# Patient Record
Sex: FEMALE | Race: BLACK OR AFRICAN AMERICAN
[De-identification: names, ages, dates, MRNs, and addresses within clinical notes are randomized per-mention and may not be internally consistent; named-entity substitution may affect disease eponyms.]

---

## 2017-04-20 NOTE — REP
CHEST, TWO VIEWS:

 

HISTORY: Hypercalcemia.

 

A diffuse increase in interstitial markings is present in the lungs. The heart is

normal in size. The pulmonary vasculature is normal in appearance. The bony

structure is intact.

 

IMPRESSION:

 

There is a diffuse increase in interstitial markings in the lungs. It can not be

determined if this is acute or chronic as no films are available for comparison.

This may represent chronic interstitial fibrosis or an acute process such as

interstitial pneumonia or edema.

 

 

Signed by

Josias Rascon MD 04/21/2017 08:35 A

## 2017-04-20 NOTE — HPEPDOC
General


Date of Admission


4/20/17


Other Providers


PCP: Mary Malloy


Attending Physician:  GORDON STALLWORTH MD


Chief Complaint


The patient is a 66-year-old female admitted with a reason for visit of 

Abnormal Lab Results.


Source:  Patient


Exam Limitations:  No limitations





History of Present Illness


66-year-old female with past medical history of hypertension, diet-controlled 

diabetes mellitus, and CVA with no residual deficits presented to the ER after 

she was sent in by her primary care provider for hypercalcemia noticed on lab 

work and complaints of persistent lethargy and fatigue over the last 3 months. 

The patient states that during this time she has felt increasingly tired and 

notes that she has had a 10 pound unintentional weight loss over the last 1 

month. She denies any acute complaints of fevers, chills, shortness of breath, 

chest pain, palpitations, abdominal pain, or any nausea/vomiting/diarrhea. She 

states that she has not gotten any of her cancer screening done other than a 

mammogram in 2015 which was normal.





In the ER, the patient was noted to have a serum calcium level 13.2 and ionized 

calcium level of 6.5. In addition the patient also had an acute kidney injury. 

The patient will be admitted under the service of Dr. Stallworth, for further 

evaluation and management of hypercalcemia.





Home Medications


Scheduled


Atenolol (Atenolol) 25 Mg Tab 25 MG PO QHS  (Reported) 


Chlorthalidone (Chlorthalidone) 25 Mg Tab 25 MG PO DAILY  (Reported) 


Potassium Chloride (Klor-Con M10) 10 Meq Tabcr 20 MEQ PO DAILY  (Reported) 





Allergies


Coded Allergies:  


     Amlodipine (Unverified  Allergy, Unknown, CAUSES GOUT, 4/20/17)


     Iodine (Verified  Allergy, Unknown, 6/7/14)


     Warfarin (Verified  Allergy, Unknown, 6/7/14)





Past Medical History


Medical History


As noted in HPI.


Surgical History


Had an implantable loop recorder placed in 2014.





Family History


Alcohol had thyroid cancer, aunt had diabetes mellitus





Social History


* Smoker:  Denies


Alcohol:  Denies


Drugs:  denies





Review of Symptoms


Other systems


10 point review of systems negative unless otherwise specified in HPI.





Physical Examination


General Exam:  Positive: Alert, Cooperative, No Acute Distress


ENT Exam:  Positive: Atraumatic, Mucous membr. moist/pink


Neck Exam:  Negative: JVD


Chest Exam:  Positive: Clear to auscultation, Normal air movement


Heart Exam:  Positive: Normal S1, Normal S2, Rate Normal


Telemetry:  Positive: Sinus


Abdomen Exam:  Positive: Soft, 


   Negative: Tenderness


Extremity Exam:  Negative: Swelling, Tenderness


Psych Exam:  Positive: Oriented x 3





Vital Signs





 Vital Signs








  Date Time  Temp Pulse Resp B/P Pulse Ox O2 Delivery O2 Flow Rate FiO2


 


4/20/17 19:14   18     


 


4/20/17 15:59 96.9 89   97 Room Air  











Laboratory Data


Labs 24H


 Laboratory Tests 2


4/20/17 17:57: 


Activated Partial Thromboplast Time 27.1, Aspartate Amino Transf (AST/SGOT) 12L

, Alanine Aminotransferase (ALT/SGPT) 18, Alkaline Phosphatase 68, Total 

Bilirubin 0.8, Direct Bilirubin 0.2, Albumin 3.3, Albumin/Globulin Ratio 0.58L, 

Amylase Level 121H, Anion Gap 7L, B-Type Natriuretic Peptide 26.0, White Blood 

Count 8.5, Red Blood Count 4.80, Hemoglobin 14.0, Hematocrit 41.0, Mean 

Corpuscular Volume 85.5, Mean Corpuscular Hemoglobin 29.2, Mean Corpuscular 

Hemoglobin Concent 34.1, Red Cell Distribution Width 14.4, Platelet Count 222, 

Neutrophils (%) (Auto) 75.6H, Lymphocytes (%) (Auto) 13.2L, Monocytes (%) (Auto

) 5.6H, Eosinophils (%) (Auto) 2.0, Basophils (%) (Auto) 0.4, Neutrophils # (

Auto) 6.4, Lymphocytes # (Auto) 1.1L, Monocytes # (Auto) 0.5, Eosinophils # (

Auto) 0.2, Basophils # (Auto) 0.0, Calcium Level 13.2H, Creatine Kinase MB 1.0, 

Creatine Kinase MB Relative Index 1.81, Free Thyroxine 1.28, Glomerular 

Filtration Rate > 60.0, Lactic Acid Level 1.1, Large Unclassified Cells # 0.3, 

Large Unclassified Cells % 3.3, Lipase 124, Magnesium Level 1.8, Parathyroid 

Hormone (Intact) 17.1, Phosphorus Level 2.2L, Prothromb Time International 

Ratio 0.94, Prothrombin Time 12.7, Thyroid Stimulating Hormone (TSH) 2.860, 

Total Creatine Kinase 55, Total Protein 9.0H, Troponin I < 0.02, Whole Blood 

Ionized Calcium 6.5*H


4/20/17 18:20: 


Urine Amorphous Sediment SMALLH, Urine Appearance CLEAR, Urine Color YELLOW, 

Urine pH 6.0, Urine Specific Gravity 1.008, Urine Protein NEGATIVE, Urine 

Glucose (UA) NEGATIVE, Urine Ketones NEGATIVE, Urine Urobilinogen 0.2, Urine 

Bilirubin NEGATIVE, Urine Leukocyte Esterase 2+H, Urine Bacteria (Auto) 1+H, 

Urine Blood NEGATIVE, Urine Calcium Carbonate Cryst(Auto) , Urine Calcium 

Oxalate Cryst (Auto) , Urine Calcium Phosphate Jazmin (Auto) , Urine Cellular 

Casts , Urine Cystine Crystals , Urine Granular Casts (Auto) , Urine Hyaline 

Casts (Auto) 1, Urine Leucine Crystals , Urine Mucus (Auto) SMALL, Urine 

Nitrite NEGATIVE, Urine Oval Fat Bodies (Auto) , Urine RBC (Auto) 5H, Urine 

Renal Epithelial Cells , Urine Sperm (Auto) , Urine Squamous Epithelial Cells 3

, Urine Transitional Epithelial Cells , Urine Trichomonas (Auto) , Urine Triple 

Phosphate Cryst (Auto) , Urine Tyrosine Crystals , Urine Uric Acid Crystals (

Auto) , Urine WBC (Auto) 10H, Urine Waxy Casts (Auto) , Urine Yeast-Like Cells (

Auto)


CBC/BMP


 Laboratory Tests


4/20/17 17:57








Red Blood Count 4.80, Mean Corpuscular Volume 85.5, Mean Corpuscular Hemoglobin 

29.2, Mean Corpuscular Hemoglobin Concent 34.1, Red Cell Distribution Width 14.4

, Neutrophils (%) (Auto) 75.6 H, Lymphocytes (%) (Auto) 13.2 L, Monocytes (%) (

Auto) 5.6 H, Eosinophils (%) (Auto) 2.0, Basophils (%) (Auto) 0.4, Neutrophils 

# (Auto) 6.4, Lymphocytes # (Auto) 1.1 L, Monocytes # (Auto) 0.5, Eosinophils # 

(Auto) 0.2, Basophils # (Auto) 0.0





Plan / VTE


VTE Prophylaxis Ordered?:  Yes





Plan


Plan


Hypercalcemia possibly 2/2 Underlying Malignancy vs Multiple Myeloma


We will admit the patient to the PCU


EKG with no acute changes


IV fluid hydration, and calcitonin ordered


SPEP, UPEP, vitamin D levels, PTH, PTHrP, serum viscosity, and skeletal bone 

survey studies ordered to further ascertain the etiology of the patient's 

hypercalcemia


CT of the Chest and Abd/Pelvis ordered to evaluate for possible underlying 

malignancy


We will continue to monitor the patient on telemetry and follow-up with serum 

calcium levels in the a.m.





Acute Kidney Injury 


Serum Creatinine 1.2 (Baseline 0.7-0.9)


IVF Hydration


Avoid Nephrotoxins


Follow up with BMP in the AM





Hypertension, stable


Continue Atenolol


Will Hold Chlorthalidone 2/2 JANA, and it's possible attributable affect of 

Hypercalcemia





Diet controlled diabetes mellitus


We'll place the patient on insulin sliding scale





History of CVA, with no residual deficits at this time


Not on ASA, Statin?


Will defer this to the patient's primary care provider





DVT prophylaxis-Lovenox subcutaneous





The patient will be admitted under the service of Dr. Stallworth, who will begin to 

follow the patient on 4/21/17 at 7 AM.








BETH COLLIER MD Apr 20, 2017 20:04

## 2017-04-20 NOTE — REP
CT abdomen and pelvis without IV or oral contrast:

 

History:  Weight loss and elevated serum calcium.

 

Findings:  The spleen is enlarged measuring 16 cm in anteroposterior span by 14

cm craniocaudal.  It is homogeneous.  Liver is not enlarged.  Gallbladder is

unremarkable.  No focal hepatic or splenic lesion is seen.  No adrenal lesion is

observed.  The kidneys are morphologically intact.  No pancreatic abnormality is

observed.  There is pancolonic diverticulosis without CT evidence of

diverticulitis.  Small and large intestinal bowel loops are otherwise

unremarkable.  The appendix has a normal appearance.  There is a calcification

adjacent to the appendix.  No uterine or ovarian mass is seen.  Bone window

settings show no bony destructive lesion.

 

Impression:

 

Moderate splenomegaly.  Pancolonic diverticulosis without evidence of

diverticulitis.  No mass or adenopathy seen.

 

 

Signed by

Ezio Dunbar MD 04/21/2017 07:55 A

## 2017-04-21 NOTE — ECGEPIP
Stationary ECG Study

                           [facility] - ED

                                       

                                       Test Date:    2017

Pat Name:     TYRON SELF            Department:   

Patient ID:   [id number]                 Room:         -

Gender:       F                        Technician:   lamin

:          [id number]               Requested By: Maja Lundborg-Gray 

Order Number: [id number]     Reading MD:   Hermila Kinney

                                 Measurements

Intervals                              Axis          

Rate:         84                       P:            57

TN:           210                      QRS:          -34

QRSD:         100                      T:            30

QT:           358                                    

QTc:          424                                    

                           Interpretive Statements

SINUS RHYTHM WITH FIRST DEGREE AV BLOCK

MARKED LEFT AXIS DEVIATION

NSTTW ABNORMALITY

SIMILAR 14

Electronically Signed On 2017 20:19:04 EDT by Hermila Kinney

## 2017-04-21 NOTE — CR
DATE OF CONSULTATION: 2017

 

PRIMARY CARE PROVIDER: Mary Malloy NP.

 

HISTORY OF PRESENT ILLNESS:

The patient is a 66-year-old female presenting to the hospital for 
hypercalcemia.

The patient initially reports having hypercalcemia back on 2017. At this

time, the patient's calcium was 12.3. The patient also reports having laboratory

work done with Dr. Moy on 2017, which also showed an elevated calcium

at 12.5. Since then, the patient then had a followup physical exam with her

primary care physician on 2017. Had lab work done at this time, and was

also found to have an elevated calcium at 13.5. The patient reports that then 
was

instructed to go to the hospital, and the patient was admitted at that time.

 

The patient reports having episodes of bronchitis-like illness for the past 15

years on-and-off.  Reports recently feeling fatigued and tired. The patient

admits to having constipation on occasion and uses Colace for this. She reports 
a

decreased desire to eat and a decreased appetite overall. She reports decreasing

liquid consumption and feels dehydrated and dry. During exam, the patient

complains of having dry, chapped lips. The patient denies any difficulty

swallowing or headaches. The patient admits to a cough that is on-and-off and

sometimes brings up phlegm and is productive. The patient admits to having sinus

problems and a deviated septum.

 

On admission to the hospital, the patient had a chest x-ray. The x-ray showed

reticular nodular pattern interstitial markings in bilateral lungs. There was no

comparison x-ray to compare to. Cannot be determined if it is acute or chronic. 
A

CT scan of the chest was performed without contrast. This showed reticular 
nodule

interstitial disease, as well as mediastinal lymphadenopathy and bronchiectasis.

At this point, pulmonology was consulted for this patient.

 

PAST MEDICAL HISTORY:

The patient has a history of hypertension, obstructive sleep apnea, diabetes, 
and

history of cerebrovascular accident.

 

SURGICAL HISTORY: The patient had deviated septum repair.

 

MEDICATIONS:

The patient takes atenolol 25 mg orally daily,  chlorthalidone 25 mg oral daily,

and potassium chloride 20 mEq by mouth daily.

 

ALLERGIES:

The patient has reported allergies to IODINE and WARFARIN, as well as 
AMLODIPINE.

 

 

SOCIAL HISTORY:

The patient lives in a senior building at home alone. The patient denies every

smoking, drinking or illicit substances. The patient has a long history of

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 
[employer]

many years ago. She is unsure if she has had asbestos exposure. Denies having

birds as pets.

 

FAMILY HISTORY:

The patient denies any family history of sarcoid or lung disease. The patient

reports having an uncle with thyroid cancer. Mother and father both are 
.

Father  of old age.

 

REVIEW OF SYSTEMS:

GENERAL: The patient reports having a recent weight loss. The patient reports at

one time trying to lose weight, but reports losing more weight than this. Denies

any fever or chills, or night sweats.

HEENT: The patient denies any eye pain or changes in vision. The patient denies

any trouble swallowing, difficulty swallowing. The patient admits to having a

deviated nasal septum. Reports possible surgery on this in the future.

NECK: The patient denies any pain in her neck. No lumps or bumps.

CARDIAC: The patient denies any chest pain or palpitations. The patient denies

orthopnea.

RESPIRATORY: The patient admits to having an occasional cough with some sputum.

Denies coughing up blood. Denies shortness of breath.

GASTROINTESTINAL (GI): The patient admits to occasional constipation and takes

Colace for this. The patient denies abdominal pain, nausea or vomiting. Admits 
to

decreased appetite.

MUSCULOSKELETAL: The patient denies muscular weakness, stiffness or pain.

NEUROLOGIC: The patient denies any tremors, weakness or dizziness. The patient

denies numbness.

VASCULAR: The patient denies calf pain with walking, or leg cramping.

URINARY: The patient denies any burning, pain or frequency with urination.

SKIN: The patient does admit to having hives. This was resolved with some cream.

The patient does admit to having some remaining in her arms.

ENDOCRINE: The patient denies hot or cold intolerance. The patient denies

frequent thirst.

PSYCHIATRIC: The patient denies depression, or nervousness or anxiety.

 

PHYSICAL EXAMINATION:

Vitals:  T97.1, P75, RR 18 /80, 95% on RA

APPEARANCE: The patient appears alert, oriented, cooperative and comfortable. No

respiratory distress.

HEENT: Extraocular muscles are intact. Pupils are equal and reactive to light 
and

accommodation. No lesions or masses in oropharynx.

Lymph: No cervical or supraclavicular adenopathy

CARDIAC: Normal S1, S2. No clicks, rubs, gallops or murmurs.  PMI is non 
displaced.  No elevated JVP.

RESPIRATORY: Clear to auscultation bilaterally. No wheezing or crackles. 
Negative

for egophony.  No accessory muscle use.  No dullness to percussion.

ABDOMEN: Bowel sounds clear to auscultation. No pain or tenderness to palpation.

No masses or organomegaly palpated.

EXTREMITIES: Radial pulses palpated bilaterally 2/4. No cyanosis or clubbing.

Skin:  Two small nodules palpated

in each upper extremity in the forearm. They are both less than 0.5 cm, and

freely mobile nodular lesions. They are nontender to palpation.  No jaundice.

Musculoskeletal:  No muscle wasting, no unilateral weakness.

 

LABORATORY DATA:

On admission, the patient's calcium was 12.7. The patient's total protein was

9.0, albumin 3.3. The patient's TSH was 2.86. Sodium 138, potassium 3.6, 
chloride

103, CO2 28, BUN 17, creatinine 1.15, fasting glucose 82. Rheumatoid factor was

less than 10.

UA on admission showed yellow clear urine with a urine pH of 6.0. There was 2+

leukocyte esterase and 10 white blood cell count and 5 red blood cell count.

There was 1+ urine bacteria, and everything else in the urine was negative.

25-hydroxylate vitamin D 22.7. An SPEP from the previous day showed a normal

pattern. Another SPEP was taken on admission and is still pending. UPEP on

admission was also still pending. Immunotype is pending and TB test is pending.

RADIOLOGIC IMAGING:

Chest x-ray performed on  showed reticular nodule pattern of interstitium.

CT of chest also showed a reticular nodular interstitial disease, as well as

mediastinal lymphadenopathy and bronchiectasis.

Abdominal CT was performed and showed enlarged spleen that was homogenous with 
no

lesions. Liver showed no lesions. There was a breast lesion the size of 2.1 cm 
in

the right breast.

Bone osseous survey showed multiple lytic lesions in the skull.

 

ASSESSMENT AND PLAN:

1.  Abnormal CT of the chest. The patient's history and exam are most likely

consistent with sarcoidosis. The patient's calcium was high at 12.7 on 
admission.

The patient's CT scan is characteristic of sarcoidosis. The patient has a

multi-year history of having episodes of difficulty breathing and not feeling

well. The patient also has hypercalcemia. The patient will need continued workup

for other causes of hypercalcemia. These labs are currently

being pending and will be helpful for diagnosis. We will not treat the patient

for sarcoidosis at this time until full workup is completed. The patient will

need completed workup for outpatient lymph node biopsy and lung bronchoscopy.

Some of this differential is discussed with patient, including multiple myeloma

which is being tested through labs. The patient also has lab work to help 
support

sarcoidosis that is pending at this time. At this time, medications may also 
need

to be looked at for possible cause of hypercalcemia. The patient formally took

vitamin D which had been stopped at this time and for the past few months. Other

medications that cause hypercalcemia need to be considered. Hyperparathyroidism

also could be considered; however, on lab work, parathyroid hormone (PTH) was

performed and was 17.1 which is a normal value. PTH-related protein lab is

currently pending. While in the hospital, the patient will need to be treated 
for

hypercalcemia. Medications to be considered include bisphosphonates and other

medications to help lower the calcium, as well as hydration. Plan is to biopsy 
the

patient upon discharge from hospital through outpatient procedure.

 

My preceptor for this patient encounter was Dr. Avtar Pardo. The preceptor was

physically present in the room during the encounter and was fully available

as needed. All aspects of the patient interview, examination, medical decision

making process, and medical care plan development were reviewed and approved by

the preceptor. The preceptor is aware and concurs with the plan as stated in the

body of this note and will attest to such by his/her co-signature.



I, Avtar Pardo, conducted an independent history and physical.  I believe the 
abnormal chest CT is likely from sarcoid which could easily be the cause of her 
hypercalcemia.  The findings on chest CT suggest that this has been a chronic 
condition.  Biopsy will be performed as an outpt.  I discussed the risks and 
benefits of the procedure with the patient and she agrees to proceed.  It is 
quite curious as to why she is hypercalcemic now after having this for so long 
and therefore other causes of hypercalcemia or other potential contributors 
should be looked at.   

LENO

## 2017-04-21 NOTE — IPNPDOC
Subjective


Date Seen


The patient was seen on 4/21/17.





Subjective


Chief Complaint/HPI


The patient is a 66-year-old female admitted with a reason for visit of 

Hypercalcemia.


Events since last encounter


patient does not have any acute complaints today , was sent in yesterday by pmd 

for abnormal lab work , she did complain of some unintentional weight loss over 

the past 3 months.





Objective


Physical Examination


General Exam:  Positive: Alert, Cooperative, No Acute Distress


ENT Exam:  Positive: Atraumatic, Mucous membr. moist/pink


Neck Exam:  Negative: JVD


Chest Exam:  Positive: Clear to auscultation, Normal air movement


Heart Exam:  Positive: Normal S1, Normal S2, Rate Normal


Telemetry:  Positive: Sinus


Abdomen Exam:  Positive: Soft, 


   Negative: Tenderness


Extremity Exam:  Negative: Swelling, Tenderness


Psych Exam:  Positive: Oriented x 3





Assessment /Plan


Problems





(1) Hypercalcemia


Status:  Acute


Problem Text:  will continue IVF , give lasix and calcitonin. 


will hold hydrochlorthalidone. 


PTH low. 25 oh vit D low, others pending. 





(2) Hilar lymphadenopathy


Status:  Acute


Problem Text:  hilar and mediastinal lymphadenopathy with diffuse 

reticulonodular infiltrates


Dr Rasmussen to see.





(3) Lytic lesion of bone on x-ray


Status:  Acute


Problem Text:  malignancy work up and MM work up in progress. 





(4) Breast mass, right


Status:  Chronic


Problem Text:  will get breast ultrasound. 





(5) Hypertension


Status:  Chronic


Problem Text:  will start clonidine , on discharge will change to ACEI





(6) Diabetes mellitus


Status:  Chronic


Problem Text:  diet controlled. 





(7) JANA (acute kidney injury)


Status:  Resolved


(8) SALOMÓN on CPAP


Status:  Chronic


(9) Hyperlipidemia


Status:  Chronic


(10) History of CVA (cerebrovascular accident) without residual deficits


Status:  Chronic


(11) Weight loss


Status:  Acute


Problem Text:  with anorexia over the past 3 months. 


malignancy work up has been started. 








Plan/VTE


VTE Prophylaxis Ordered?:  Yes





VS, I&O, 24H, Lio


Vital Signs/I&O





 Vital Signs








  Date Time  Temp Pulse Resp B/P Pulse Ox O2 Delivery O2 Flow Rate FiO2


 


4/21/17 07:57 97.8 85 20 177/79 97 Room Air  














 I&O- Last 24 Hours up to 6 AM 


 


 4/21/17





 06:00


 


Intake Total 950 ml


 


Output Total 450 ml


 


Balance 500 ml











Laboratory Data


24H LABS


 Laboratory Tests 2


4/20/17 17:50: 


25-Hydroxy Vitamin D Total 22.7L, Rheumatoid Factor < 10.0, Total Protein 9.2H


4/20/17 17:57: 


Total Protein 9.0H, Activated Partial Thromboplast Time 27.1, Aspartate Amino 

Transf (AST/SGOT) 12L, Alanine Aminotransferase (ALT/SGPT) 18, Alkaline 

Phosphatase 68, Total Bilirubin 0.8, Direct Bilirubin 0.2, Albumin 3.3, Albumin/

Globulin Ratio 0.58L, Amylase Level 121H, Anion Gap 7L, B-Type Natriuretic 

Peptide 26.0, White Blood Count 8.5, Red Blood Count 4.80, Hemoglobin 14.0, 

Hematocrit 41.0, Mean Corpuscular Volume 85.5, Mean Corpuscular Hemoglobin 29.2

, Mean Corpuscular Hemoglobin Concent 34.1, Red Cell Distribution Width 14.4, 

Platelet Count 222, Neutrophils (%) (Auto) 75.6H, Lymphocytes (%) (Auto) 13.2L, 

Monocytes (%) (Auto) 5.6H, Eosinophils (%) (Auto) 2.0, Basophils (%) (Auto) 0.4

, Neutrophils # (Auto) 6.4, Lymphocytes # (Auto) 1.1L, Monocytes # (Auto) 0.5, 

Eosinophils # (Auto) 0.2, Basophils # (Auto) 0.0, Calcium Level 13.2H, Creatine 

Kinase MB 1.0, Creatine Kinase MB Relative Index 1.81, Free Thyroxine 1.28, 

Glomerular Filtration Rate > 60.0, Lactic Acid Level 1.1, Large Unclassified 

Cells # 0.3, Large Unclassified Cells % 3.3, Lipase 124, Magnesium Level 1.8, 

Parathyroid Hormone (Intact) 17.1, Phosphorus Level 2.2L, Prothromb Time 

International Ratio 0.94, Prothrombin Time 12.7, Thyroid Stimulating Hormone (

TSH) 2.860, Total Creatine Kinase 55, Troponin I < 0.02, Whole Blood Ionized 

Calcium 6.5*H


4/20/17 18:20: 


Urine Amorphous Sediment SMALLH, Urine Appearance CLEAR, Urine Color YELLOW, 

Urine pH 6.0, Urine Specific Gravity 1.008, Urine Protein NEGATIVE, Urine 

Glucose (UA) NEGATIVE, Urine Ketones NEGATIVE, Urine Urobilinogen 0.2, Urine 

Bilirubin NEGATIVE, Urine Leukocyte Esterase 2+H, Urine Bacteria (Auto) 1+H, 

Urine Blood NEGATIVE, Urine Calcium Carbonate Cryst(Auto) , Urine Calcium 

Oxalate Cryst (Auto) , Urine Calcium Phosphate Jazmin (Auto) , Urine Cellular 

Casts , Urine Cystine Crystals , Urine Granular Casts (Auto) , Urine Hyaline 

Casts (Auto) 1, Urine Leucine Crystals , Urine Mucus (Auto) SMALL, Urine 

Nitrite NEGATIVE, Urine Oval Fat Bodies (Auto) , Urine Total Protein 26.5H, 

Urine RBC (Auto) 5H, Urine Renal Epithelial Cells , Urine Sperm (Auto) , Urine 

Squamous Epithelial Cells 3, Urine Transitional Epithelial Cells , Urine 

Trichomonas (Auto) , Urine Triple Phosphate Cryst (Auto) , Urine Tyrosine 

Crystals , Urine Uric Acid Crystals (Auto) , Urine WBC (Auto) 10H, Urine Waxy 

Casts (Auto) , Urine Yeast-Like Cells (Auto) 


4/20/17 22:44: Bedside Glucose (Misc Panel) 107


4/21/17 05:15: 


Blood Urea Nitrogen 14, Creatinine 0.94, Sodium Level 140, Potassium Level 3.0L

, Chloride Level 104, Carbon Dioxide Level 28, Calcium Level 11.6H, Aspartate 

Amino Transf (AST/SGOT) 12L, Alanine Aminotransferase (ALT/SGPT) 15, Alkaline 

Phosphatase 53, Total Bilirubin 0.7, Total Protein 8.5H, Albumin 2.9L, Albumin/

Globulin Ratio 0.52L, Anion Gap 8, Glomerular Filtration Rate > 60.0


4/21/17 05:16: 


CBC/BMP


 Laboratory Tests


4/20/17 17:57








Red Blood Count 4.80, Mean Corpuscular Volume 85.5, Mean Corpuscular Hemoglobin 

29.2, Mean Corpuscular Hemoglobin Concent 34.1, Red Cell Distribution Width 14.4

, Neutrophils (%) (Auto) 75.6 H, Lymphocytes (%) (Auto) 13.2 L, Monocytes (%) (

Auto) 5.6 H, Eosinophils (%) (Auto) 2.0, Basophils (%) (Auto) 0.4, Neutrophils 

# (Auto) 6.4, Lymphocytes # (Auto) 1.1 L, Monocytes # (Auto) 0.5, Eosinophils # 

(Auto) 0.2, Basophils # (Auto) 0.0





4/21/17 05:15








Red Blood Count 4.33, Mean Corpuscular Volume 84.8, Mean Corpuscular Hemoglobin 

29.0, Mean Corpuscular Hemoglobin Concent 34.2, Red Cell Distribution Width 14.4

, Calcium Level 11.6 H, Aspartate Amino Transf (AST/SGOT) 12 L, Alanine 

Aminotransferase (ALT/SGPT) 15, Alkaline Phosphatase 53, Total Bilirubin 0.7, 

Total Protein 8.5 H, Albumin 2.9 L








GORDON COOLEY MD Apr 21, 2017 11:06

## 2017-04-21 NOTE — REP
Clinical:  Hypercalcemia with mediastinal/hilar adenopathy and newly diagnosed

lung disease.

 

Technique:  16 total images to include the skull, spine, chest, bilateral humeri

and femurs.

 

Findings:

With the exception of a few scattered lytic lesions projecting over the frontal

bone on PA skull radiograph, no further lytic or blastic abnormalities are

identified.

 

Age related degenerative changes to the lumbosacral, thoracic and cervical spine

noted without fracture / compression injury or subluxation.  Age-related

degenerative changes involving the bilateral shoulder, hip and knee joints noted.

No acute or obvious healed fracture identified.

 

Frontal view of the thoracic spine demonstrates hilar adenopathy (right greater

than left) and diffuse reticulonodular interstitial disease.

 

Impression:

1.  Few scattered lytic lesions projecting over the frontal bone are nonspecific

and no other lytic/blastic lesions or evidence for metastatic disease

appreciated.

2.  Degenerative changes to the spine without acute fracture / compression injury

or subluxation.

3.  Age-related degenerative changes to the shoulder, hip and knee joints.

 

 

Signed by

Kuldeep Vázquez MD 04/21/2017 12:46 A

## 2017-04-21 NOTE — REP
Focused right breast sonography:

 

History:  Right breast mass seen on CT study of the chest.

 

Findings:  The right breast is scanned medially from 12-o'clock to 6-o'clock.

The patient reports that the mass found here corresponds to a palpable lump that

she claims to have for 20 years.

 

Findings:  At 3-o'clock there is an oval-shaped well-circumscribed hypoechoic

lesion with enhanced through transmission consistent with a fibroadenoma.  Its

long axis is roughly parallel to the skin.  Its dimensions are 2.0 x 2.0 x 1.0

cm.  It is compatible with a fibroadenoma.

 

Impression:

 

Hypoechoic oval shaped mass in the medial aspect of the right breast compatible

with fibroadenoma.  Stable for many years by history.  BIRADS category II benign

sonography.

 

 

Signed by

Ezio Dunbar MD 04/21/2017 05:02 P

## 2017-04-22 NOTE — IPNPDOC
Subjective


Date Seen


The patient was seen on 4/22/17.





Subjective


Chief Complaint/HPI


The patient is a 66-year-old female admitted with a reason for visit of 

Hypercalcemia.


Events since last encounter


No complaints this am.





Objective


Physical Examination


General Exam:  Positive: Alert, Cooperative, No Acute Distress


ENT Exam:  Positive: Atraumatic, Mucous membr. moist/pink


Neck Exam:  Negative: JVD


Chest Exam:  Positive: Clear to auscultation, Normal air movement


Heart Exam:  Positive: Normal S1, Normal S2, Rate Normal


Telemetry:  Positive: Sinus


Abdomen Exam:  Positive: Soft, 


   Negative: Tenderness


Extremity Exam:  Negative: Swelling, Tenderness


Psych Exam:  Positive: Oriented x 3





Assessment /Plan


Problems





(1) Hypercalcemia


Status:  Acute


Problem Text:  resolved will stop calcitonin.will continue low dose lasix 


will hold hydrochlorthalidone. 


PTH low. 25 oh vit D low, others pending. 





(2) Hilar lymphadenopathy


Status:  Acute


Problem Text:  hilar and mediastinal lymphadenopathy with diffuse 

reticulonodular infiltrates


To follow with Dr Rasmussen as outpatient for bronchoscopy and biopsy. 





(3) Lytic lesion of bone on x-ray


Status:  Acute


Problem Text:  malignancy work up and MM work up in progress. 





(4) Breast mass, right


Status:  Chronic


Problem Text:  mammogram and breast US shows its fibroadenoma stable from many 

years. 





(5) Hypertension


Status:  Chronic


Problem Text:  will start clonidine , lisinopril





(6) Diabetes mellitus


Status:  Chronic


Problem Text:  diet controlled. 





(7) JANA (acute kidney injury)


Status:  Resolved


(8) SALOMÓN on CPAP


Status:  Chronic


(9) Hyperlipidemia


Status:  Chronic


(10) History of CVA (cerebrovascular accident) without residual deficits


Status:  Chronic


(11) Weight loss


Status:  Acute


Problem Text:  with anorexia over the past 3 months. 


malignancy work up has been started. 








Plan/VTE


VTE Prophylaxis Ordered?:  Yes





VS, I&O, 24H, Fishbone


Vital Signs/I&O





 Vital Signs








  Date Time  Temp Pulse Resp B/P Pulse Ox O2 Delivery O2 Flow Rate FiO2


 


4/22/17 06:00 98.5 71 20 166/77 95 Room Air  














 I&O- Last 24 Hours up to 6 AM 


 


 4/22/17





 06:00


 


Intake Total 2040 ml


 


Output Total 2500 ml


 


Balance -460 ml











Laboratory Data


24H LABS


 Laboratory Tests 2


4/21/17 12:12: Bedside Glucose (Misc Panel) 122H


4/21/17 17:05: Bedside Glucose (Misc Panel) 120H


4/21/17 20:39: Bedside Glucose (Misc Panel) 107


4/22/17 05:56: 


Blood Urea Nitrogen 10, Creatinine 0.91, Sodium Level 141, Potassium Level 4.1#

, Chloride Level 109H, Carbon Dioxide Level 25, Calcium Level 10.2, Aspartate 

Amino Transf (AST/SGOT) 10L, Alanine Aminotransferase (ALT/SGPT) 14, Alkaline 

Phosphatase 58, Total Bilirubin 0.7, Total Protein 8.2, Albumin 3.0L, Albumin/

Globulin Ratio 0.58L, Anion Gap 7L, Glomerular Filtration Rate > 60.0, Whole 

Blood Ionized Calcium 5.8H


CBC/BMP


 Laboratory Tests


4/22/17 05:56








Calcium Level 10.2, Aspartate Amino Transf (AST/SGOT) 10 L, Alanine 

Aminotransferase (ALT/SGPT) 14, Alkaline Phosphatase 58, Total Bilirubin 0.7, 

Total Protein 8.2, Albumin 3.0 L, Red Blood Count 4.40, Mean Corpuscular Volume 

86.4, Mean Corpuscular Hemoglobin 28.3, Mean Corpuscular Hemoglobin Concent 32.8

, Red Cell Distribution Width 14.4








GORDON COOLEY MD Apr 22, 2017 08:37

## 2017-04-23 NOTE — IPNPDOC
Subjective


Date Seen


The patient was seen on 4/23/17.





Subjective


Chief Complaint/HPI


The patient is a 66-year-old female admitted with a reason for visit of 

Hypercalcemia.


Events since last encounter


no complaints





Objective


Physical Examination


General Exam:  Positive: Alert, Cooperative, No Acute Distress


ENT Exam:  Positive: Atraumatic, Mucous membr. moist/pink


Neck Exam:  Negative: JVD


Chest Exam:  Positive: Clear to auscultation, Normal air movement


Heart Exam:  Positive: Normal S1, Normal S2, Rate Normal


Telemetry:  Positive: Sinus


Abdomen Exam:  Positive: Soft, 


   Negative: Tenderness


Extremity Exam:  Negative: Swelling, Tenderness


Psych Exam:  Positive: Oriented x 3





Assessment /Plan


Problems





(1) Hypercalcemia


Status:  Acute


Problem Text:  Calcium again rising. Will give pamidronate 1 dose. 


will continue low dose lasix 


PTH low. 25 oh vit D low, others pending. 





(2) Hilar lymphadenopathy


Status:  Acute


Problem Text:  hilar and mediastinal lymphadenopathy with diffuse 

reticulonodular infiltrates


To follow with Dr Rasmussen as outpatient for bronchoscopy and biopsy. 





(3) Lytic lesion of bone on x-ray


Status:  Acute


Problem Text:  malignancy work up and MM work up in progress. 





(4) Breast mass, right


Status:  Chronic


Problem Text:  mammogram and breast US shows its fibroadenoma stable from many 

years. 





(5) Hypertension


Status:  Chronic


Response to Treatment:  Improving


Problem Text:  better controlled with losartan and clonidine and atenolol





(6) Diabetes mellitus


Status:  Chronic


Problem Text:  diet controlled. 





(7) JANA (acute kidney injury)


Status:  Resolved


(8) SALOMÓN on CPAP


Status:  Chronic


(9) Hyperlipidemia


Status:  Chronic


(10) History of CVA (cerebrovascular accident) without residual deficits


Status:  Chronic


(11) Weight loss


Status:  Acute


Problem Text:  with anorexia over the past 3 months. 


malignancy work up has been started. 








Plan/VTE


VTE Prophylaxis Ordered?:  Yes





VS, I&O, 24H, Fishbone


Vital Signs/I&O





 Vital Signs








  Date Time  Temp Pulse Resp B/P Pulse Ox O2 Delivery O2 Flow Rate FiO2


 


4/23/17 06:00 98.2 65 18 148/70 93 Room Air  














 I&O- Last 24 Hours up to 6 AM 


 


 4/23/17





 06:00


 


Intake Total 1260 ml


 


Output Total 1250 ml


 


Balance 10 ml











Laboratory Data


24H LABS


 Laboratory Tests 2


4/22/17 11:50: Bedside Glucose (Misc Panel) 116H


4/22/17 16:54: Bedside Glucose (Misc Panel) 110


4/22/17 20:39: Bedside Glucose (Misc Panel) 111


4/23/17 06:07: 


Blood Urea Nitrogen 10, Creatinine 0.83, Sodium Level 138, Potassium Level 3.2#L

, Chloride Level 105, Carbon Dioxide Level 26, Calcium Level 10.9H, Aspartate 

Amino Transf (AST/SGOT) 9L, Alanine Aminotransferase (ALT/SGPT) 13, Alkaline 

Phosphatase 52, Total Bilirubin 0.8, Total Protein 7.9, Albumin 2.8L, Albumin/

Globulin Ratio 0.55L, Anion Gap 7L, Glomerular Filtration Rate > 60.0


CBC/BMP


 Laboratory Tests


4/23/17 06:07








Calcium Level 10.9 H, Aspartate Amino Transf (AST/SGOT) 9 L, Alanine 

Aminotransferase (ALT/SGPT) 13, Alkaline Phosphatase 52, Total Bilirubin 0.8, 

Total Protein 7.9, Albumin 2.8 L, Red Blood Count 4.35, Mean Corpuscular Volume 

85.5, Mean Corpuscular Hemoglobin 29.5, Mean Corpuscular Hemoglobin Concent 34.5

, Red Cell Distribution Width 14.4








GORDON COOLEY MD Apr 23, 2017 09:15

## 2017-04-24 NOTE — DSES
DATE OF ADMISSION:  04/20/2017

DATE OF DISCHARGE:  04/24/2017

 

PRIMARY CARE PROVIDER: Mary Malloy NP

 

DISCHARGE DIAGNOSES:

1. Hypercalcemia, workup under progress, most probably sarcoidosis.

2. Hypertension.

3. Hypokalemia.

4. Right breast fibroadenoma.

5. Lytic lesions on the skull x-ray.

6. Acute kidney injury, resolved.

7. Diabetes, on diet control.

8. Obstructive sleep apnea (SALOMÓN), on continuous positive airway pressure (CPAP).

 

9. Hyperlipidemia.

10. History of cerebrovascular accident (CVA) without any residual deficits.

11. Hilar lymphadenopathy, to followup with Dr. Pardo as an outpatient.

 

DISCHARGE MEDICATIONS:

- atenolol 50 mg at bedtime

- losartan 100 mg daily

- Lasix 20 mg daily

- prednisone 20 mg daily

- clonidine 0.1 mg by mouth three times a day as needed if systolic blood

pressure greater than 170

- potassium chloride 20 mEq by mouth daily

 

HOSPITAL COURSE: This is a 66-year-old female who has been noted to have

hypercalcemia since March of 2017 and sent in by her primary care provider this

time for a calcium level of 13.2. The patient did complain of feeling weak and

tired and losing weight over the past three months. The patient was recently

started on regular vitamin D supplementation from January of 2017 which was

stopped in March 2017 when she was first noted to be hypercalcemic. The patient

had symptoms of bronchitis, cough, and nasal congestion with a deviated nasal

septum going on for years. Here the patient had a CT chest which showed bilateral

hilar lymphadenopathy, diffuse reticular nodular interstitial lung disease, and

bilateral mediastinal lymphadenopathy, most consistent with granulomatous disease

like sarcoidosis. The patient was evaluated by Dr. Pardo from pulmonology and the

plan is to have outpatient bronchoscopy and biopsy done within the next 2-3

weeks. In the hospital, the patient was initially treated with IV fluids, Lasix,

and calcitonin for her calcium. After stopping calcitonin and IV fluids, her

calcium again lucero so the patient was given one dose of pamidronate 60 mg. The

patient's antihypertensive medications were adjusted. With current regimen of

medications, her blood pressure is reasonably well-controlled. The patient also

has malignancy workup in progress including parathyroid hormone-related protein

(PTHrP), multiple myeloma workup. The results of which need to be followed up as

an outpatient. The patient was also noted to be hypokalemic in the hospital which

was replaced aggressively. Her sugars remained in acceptable control with only

diet control and did not require any medications. Her tuberculosis (TB)

interferon tests were negative. The patient also had a CT abdomen and pelvis done

which showed moderate splenomegaly, diverticulosis. No mass or lymphadenopathy.

CT chest showed a breast mass on the right, so the patient underwent breast

ultrasound and it was found to be fibroadenoma which has been stable for many

years. At present, the patient is symptom free. Her blood pressure is

well-controlled. Functionally, she is at her baseline and is going to be

discharged home in a stable condition.

 

PHYSICAL EXAMINATION:

VITAL SIGNS: Temperature 96.8, pulse 66, respiratory rate 18, blood pressure

155/73, pulse oximetry 98% on room air.

GENERAL: Patient is awake, alert and oriented times three, sitting up in bed, in

no acute distress.

HEENT: Normocephalic, atraumatic. Moist mucous membranes. Anicteric eyes.

CHEST: Overall poor air entry but there is no wheezing or crackles.

CARDIOVASCULAR: S1, S2, regular. No rub, murmur, or gallop.

ABDOMEN: Soft, nontender. Bowel sounds present.

EXTREMITIES: No edema.

 

LABORATORY DATA: WBC 10, hemoglobin 12.2, platelet count 229.

 

Sodium 137, potassium 2.8 being replaced, chloride 103, bicarbonate 26, BUN 11,

creatinine 0.8, calcium 10.9, bilirubin 0.7. Liver function tests are normal.

Albumin is 2.8.

 

Bone survey showed few scattered lytic lesions projecting over the frontal lobe,

nonspecific. There were no other lytic or blastic lesions and no evidence of

metastatic disease appreciated.

 

CT scan of chest, abdomen and pelvis as above.

 

DISPOSITION: The patient is discharged home in a stable condition.

 

DISCHARGE INSTRUCTIONS: The patient is to followup with primary care provider for

laboratory work in three days. The patient is to followup with Dr. Pardo in 1-2

weeks. Regular diet. Activity as tolerated. The patient is to abstain from any

calcium or vitamin D supplements.

 

ADDITIONAL DISCHARGE DIAGNOSIS:

Chronic deviated nasal septum with blockage of left nostril.

## 2017-05-01 NOTE — REP
CT study of the chest without contrast:

 

History:  Weight loss.

 

Comparison is made with today's chest x-ray showing diffuse interstitial lung

disease.  Elevated serum calcium.

 

CT findings:  There is an extensive pattern of reticulonodular opacities in the

central lobular and heraclio bronchovascular tree in bud type distribution, upper

lobe predominant and bilateral.  There are also nodular changes in the lower

lobes as well, but less numerous.  There is some heraclio bronchovascular

opacification with air bronchograms in the lower lobes and to a lesser extent in

the upper lobes.  There is mediastinal lymphadenopathy and hilar fullness

bilaterally.  Pretracheal and aorticopulmonary window adenopathy is seen.

Subcarinal nodes are prominent.  The anterior mediastinum appears spared.  There

is an oval shaped 2.1 cm nodule in the medial aspect of the right breast soft

tissues, which appears smoothly marginated.  This may be a fibroadenoma.  No bony

abnormality is appreciated.

 

Impression:

 

Diffuse reticulonodular interstitial lung disease associated with bilateral hilar

and mediastinal adenopathy.  The possibilities include granulomatous disease such

as sarcoidosis and other granulomatous diseases. Differential is broad and may

include lymphangitic spread of malignancy.  Pulmonary medicine evaluation is

warranted.

 

Also noted is an oval-shaped right medial breast mass, which may be a

fibroadenoma.  Focused right breast ultrasound is recommended for further

evaluation and consideration of ultrasound guided needle biopsy.

 

 

Signed by

Ezio Dunbar MD 04/21/2017 07:55 A General

## 2017-06-06 NOTE — PFTRPT
Tech: LEOBARDOLolis LICEA RRT

Age: 66  Sex: Female  Race: Black

Height: 63.50  Inches  Weight: 185.00  Lbs  BSA: 1.88

Diagnosis: D86.2



TECH NOTES: Test meets the ATS standards for acceptability and repeatability.  
The patient was given 

four puffs of albuterol for postbronchodilator.

 

PULMONARY FUNCTION REPORT



ORDERING PROVIDER: Avtar Pardo DO



DATE OF SERVICE: 06/06/17



SPIROMETRY: Pre and post bronchodilator study of excellent technical quality.  
The forced vital capacity is normal.  The FEV1 is in proportion.  The 
obstructive index is, therefore, normal.



FLOW VOLUME LOOP: The expiratory limb of the flow volume loop is normal.  No 
bronchodilator response is identified.



LUNG VOLUMES: The total lung capacity is normal.  The residual volume is in 
proportion.



DIFFUSION CAPACITY: The diffusion capacity is normal.



HEMOGLOBIN: The hemoglobin is acceptable at 15.2.



AIRWAY MECHANICS: Airways resistance and conductance are normal.



IMPRESSION: Normal study.
MTDD

## 2017-07-21 NOTE — REP
CT of the brain without IV contrast:

 

There are no comparisons.

 

There is no hemorrhage.  There is no edema, mass effect or midline shift.  The

cortical stripe is unremarkable.  The ventricles and sulci are normal size.  The

visualized paranasal sinuses and mastoid air cells are clear.

 

Impression:

 

There is no hemorrhage, acute infarct or mass.  Negative CT study of the brain.

 

 

Signed by

Lenny Germain MD 07/21/2017 01:29 P

## 2017-07-22 NOTE — ECGEPIP
Stationary ECG Study

                           [facility] - ED

                                       

                                       Test Date:    2017

Pat Name:     TYRON SELF            Department:   

Patient ID:   [id number]                 Room:         -

Gender:       F                        Technician:   froy

:          1950               Requested By: Jeremy Almanza PA-C

Order Number: [id number]     Reading MD:   Hermila Kinney

                                 Measurements

Intervals                              Axis          

Rate:         79                       P:            62

IA:           193                      QRS:          -41

QRSD:         83                       T:            60

QT:           381                                    

QTc:          437                                    

                           Interpretive Statements

SINUS RHYTHM

MARKED LEFT AXIS DEVIATION

NONSPECIFIC T-WAVE ABNORMALITY

SIMILAR 17

Electronically Signed On 2017 19:59:19 EDT by Hermila Kinney

## 2017-10-19 NOTE — REP
Clinical:  Enlarged thyroid gland.

 

Technique:  Real time gray scale ultrasound examination using linear high

frequency transducer.

 

Findings:

The thyroid gland is diffusely heterogeneous, but normal in contour and size.

 

The right thyroid lobe measures 3.3 x 1.8 x 1.5 cm in includes 8-0.0 x 2.9 x 1.7

cm simple upper pole cyst, 3.8 x 3.8 x 2.7 cm complex lower pole cyst, and 6.1 x

8.4 x 5.4 mm heterogeneous hypoechoic midpole nodule.  The left lobe measures 2.9

x 1.4 x 1.2 cm and includes a 4.4 x 5.0 x 3.6 mm complex hypoechoic cyst/nodule.

Isthmus measures 4 mm in width.

 

Impression:

Heterogeneous thyroid gland with few scattered nodules as detailed above

 

 

 

Signed by

Kuldeep Vázquez MD 10/19/2017 07:30 A

## 2017-11-22 NOTE — REP
RENAL ULTRASOUND WITH DUPLEX DOPPLER RENAL ARTERY EVALUATION:

 

Real-time sonographic evaluation of the kidneys performed.  The kidneys are

normal in size and echotexture, right kidney measuring 11.4 x 3.5 x 5.8 cm and

left kidney 10.2 x 4.5 x 5.4 cm.  There is no hydronephrosis, renal mass or

nephrolithiasis.  Urinary bladder is mildly distended and grossly unremarkable.

 

Ultrasound evaluation and duplex Doppler interrogation of the main renal arteries

is performed.  Abdominal aorta is not well seen due to overlying bowel gas nor is

the origin of the left main renal artery.  Peak systolic velocity of the right

main renal artery distally is 128 cm/s.  Resistive indices are measured in the

upper, middle and lower thirds of the right kidney and range between 0.58 and

0.67.  Accelerations times range between 0.023 and 0.048.  Peak systolic velocity

in the distal left main renal artery is 63 cm/s.  Resistive indices of the left

kidney range between 0.65 and 0.71.  Acceleration times range between 0.033 and

0.044.

 

IMPRESSION:

 

Kidneys appear unremarkable.  The renal artery Doppler study is somewhat

suboptimal due to overlying bowel gas as the abdominal aorta and origin of the

left main renal artery are obscured.  Otherwise there is no definite evidence of

significant renal artery stenosis.  Consider CTA or MRA.

 

 

Signed by

Lenny Gregg MD 11/22/2017 01:53 P

## 2018-03-05 ENCOUNTER — HOSPITAL ENCOUNTER (OUTPATIENT)
Dept: HOSPITAL 53 - M CARPUL | Age: 68
End: 2018-03-05
Payer: MEDICARE

## 2018-03-05 DIAGNOSIS — D86.9: Primary | ICD-10-CM

## 2018-03-05 PROCEDURE — 94060 EVALUATION OF WHEEZING: CPT

## 2018-03-14 ENCOUNTER — HOSPITAL ENCOUNTER (OUTPATIENT)
Dept: HOSPITAL 53 - M RAD | Age: 68
End: 2018-03-14
Attending: INTERNAL MEDICINE
Payer: MEDICARE

## 2018-03-14 ENCOUNTER — HOSPITAL ENCOUNTER (OUTPATIENT)
Dept: HOSPITAL 53 - M LAB | Age: 68
End: 2018-03-14
Payer: MEDICARE

## 2018-03-14 ENCOUNTER — HOSPITAL ENCOUNTER (OUTPATIENT)
Dept: HOSPITAL 53 - M LAB | Age: 68
End: 2018-03-14
Attending: NURSE PRACTITIONER
Payer: MEDICARE

## 2018-03-14 DIAGNOSIS — E11.9: ICD-10-CM

## 2018-03-14 DIAGNOSIS — E78.5: ICD-10-CM

## 2018-03-14 DIAGNOSIS — D86.9: Primary | ICD-10-CM

## 2018-03-14 DIAGNOSIS — E83.52: Primary | ICD-10-CM

## 2018-03-14 DIAGNOSIS — N63.10: ICD-10-CM

## 2018-03-14 DIAGNOSIS — Z79.899: ICD-10-CM

## 2018-03-14 DIAGNOSIS — E83.52: ICD-10-CM

## 2018-03-14 LAB
ALBUMIN/GLOBULIN RATIO: 0.92 (ref 1–1.93)
ALBUMIN/GLOBULIN RATIO: 0.92 (ref 1–1.93)
ALBUMIN: 3.4 GM/DL (ref 3.2–5.2)
ALBUMIN: 3.4 GM/DL (ref 3.2–5.2)
ALKALINE PHOSPHATASE: 59 U/L (ref 45–117)
ALKALINE PHOSPHATASE: 59 U/L (ref 45–117)
ALT SERPL W P-5'-P-CCNC: 22 U/L (ref 12–78)
ALT SERPL W P-5'-P-CCNC: 22 U/L (ref 12–78)
ANION GAP: 11 MEQ/L (ref 8–16)
ANION GAP: 8 MEQ/L (ref 8–16)
APPEARANCE, URINE: CLEAR
AST SERPL-CCNC: 10 U/L (ref 7–37)
AST SERPL-CCNC: 9 U/L (ref 7–37)
BACTERIA UR QL AUTO: NEGATIVE
BASO #: 0 10^3/UL (ref 0–0.2)
BASO %: 0.3 % (ref 0–1)
BILIRUBIN, URINE AUTO: NEGATIVE
BILIRUBIN,TOTAL: 0.7 MG/DL (ref 0.2–1)
BILIRUBIN,TOTAL: 0.8 MG/DL (ref 0.2–1)
BLOOD UREA NITROGEN: 18 MG/DL (ref 7–18)
BLOOD UREA NITROGEN: 18 MG/DL (ref 7–18)
BLOOD, URINE BLOOD: NEGATIVE
CALCIUM LEVEL: 8.8 MG/DL (ref 8.8–10.2)
CALCIUM LEVEL: 8.9 MG/DL (ref 8.8–10.2)
CARBON DIOXIDE LEVEL: 27 MEQ/L (ref 21–32)
CARBON DIOXIDE LEVEL: 28 MEQ/L (ref 21–32)
CHLORIDE LEVEL: 105 MEQ/L (ref 98–107)
CHLORIDE LEVEL: 106 MEQ/L (ref 98–107)
CHOLEST SERPL-MCNC: 272 MG/DL (ref ?–200)
CHOLESTEROL RISK RATIO: 2.08 (ref ?–5)
CORTISOL AM: 1.5 UG/DL (ref 4.3–22.4)
CREAT UR-MCNC: 91.8 MG/DL
CREATININE FOR GFR: 0.68 MG/DL (ref 0.55–1.3)
CREATININE FOR GFR: 0.71 MG/DL (ref 0.55–1.3)
EOS #: 0.1 10^3/UL (ref 0–0.5)
EOSINOPHIL NFR BLD AUTO: 1 % (ref 0–3)
EST. AVERAGE GLUCOSE BLD GHB EST-MCNC: 174 MG/DL (ref 60–110)
FREE T4: 0.95 NG/DL (ref 0.76–1.46)
GFR SERPL CREATININE-BSD FRML MDRD: > 60 ML/MIN/{1.73_M2} (ref 45–?)
GFR SERPL CREATININE-BSD FRML MDRD: > 60 ML/MIN/{1.73_M2} (ref 45–?)
GLUCOSE, FASTING: 107 MG/DL (ref 70–100)
GLUCOSE, FASTING: 107 MG/DL (ref 70–100)
GLUCOSE, URINE (UA) AUTO: NEGATIVE MG/DL
HDLC SERPL-MCNC: 131 MG/DL (ref 40–?)
HEMATOCRIT: 41.6 % (ref 36–47)
HEMOGLOBIN: 14 G/DL (ref 12–16)
IMMATURE GRANULOCYTE %: 0.5 % (ref 0–3)
KETONE, URINE AUTO: NEGATIVE MG/DL
LDL CHOLESTEROL: 117.6 MG/DL (ref ?–100)
LEUKOCYTE ESTERASE UR QL STRIP.AUTO: (no result)
LYMPH #: 3 10^3/UL (ref 1.5–4.5)
LYMPH %: 24.3 % (ref 24–44)
MEAN CORPUSCULAR HEMOGLOBIN: 29.7 PG (ref 27–33)
MEAN CORPUSCULAR HGB CONC: 33.7 G/DL (ref 32–36.5)
MEAN CORPUSCULAR VOLUME: 88.3 FL (ref 80–96)
MICROALBUMIN UR-MCNC: 25.6 MG/L
MICROALBUMIN/CREAT UR: 27.8 MCG/MG (ref 0–30)
MONO #: 1 10^3/UL (ref 0–0.8)
MONO %: 8.2 % (ref 0–5)
NEUTROPHILS #: 8 10^3/UL (ref 1.8–7.7)
NEUTROPHILS %: 65.7 % (ref 36–66)
NITRITE, URINE AUTO: NEGATIVE
NONHDLC SERPL-MCNC: 141 MG/DL
NRBC BLD AUTO-RTO: 0 % (ref 0–0)
PH,URINE: 7 UNITS (ref 5–9)
PLATELET COUNT, AUTOMATED: 233 10^3/UL (ref 150–450)
POTASSIUM SERUM: 3.3 MEQ/L (ref 3.5–5.1)
POTASSIUM SERUM: 3.3 MEQ/L (ref 3.5–5.1)
PROT UR QL STRIP.AUTO: NEGATIVE MG/DL
PTH-INTACT SERPL-MCNC: 79.2 PG/ML (ref 18.5–88)
RBC, URINE AUTO: 2 /HPF (ref 0–3)
RED BLOOD COUNT: 4.71 10^6/UL (ref 4–5.4)
RED CELL DISTRIBUTION WIDTH: 13.2 % (ref 11.5–14.5)
SODIUM LEVEL: 142 MEQ/L (ref 136–145)
SODIUM LEVEL: 143 MEQ/L (ref 136–145)
SPECIFIC GRAVITY URINE AUTO: 1.01 (ref 1–1.03)
SQUAMOUS #/AREA URNS AUTO: 1 /HPF (ref 0–6)
THYROID STIMULATING HORMONE: 3.98 UIU/ML (ref 0.36–3.74)
TOTAL PROTEIN: 7.1 GM/DL (ref 6.4–8.2)
TOTAL PROTEIN: 7.1 GM/DL (ref 6.4–8.2)
TOTAL T3: 93.7 NG/DL (ref 60–181)
TRIGLYCERIDES LEVEL: 117 MG/DL (ref ?–150)
UROBILINOGEN, URINE AUTO: 0.2 MG/DL (ref 0–2)
WBC, URINE AUTO: 13 /HPF (ref 0–3)
WHITE BLOOD COUNT: 12.2 10^3/UL (ref 4–10)

## 2018-03-14 PROCEDURE — 71250 CT THORAX DX C-: CPT

## 2018-03-16 LAB — ACE SERPL-CCNC: 30 U/L (ref 14–82)

## 2018-05-16 ENCOUNTER — HOSPITAL ENCOUNTER (OUTPATIENT)
Dept: HOSPITAL 53 - M LAB | Age: 68
End: 2018-05-16
Attending: NURSE PRACTITIONER
Payer: MEDICARE

## 2018-05-16 DIAGNOSIS — M25.432: Primary | ICD-10-CM

## 2018-05-16 LAB — URIC ACID: 5.9 MG/DL (ref 2.6–6)

## 2018-05-16 PROCEDURE — 73110 X-RAY EXAM OF WRIST: CPT

## 2018-05-18 LAB — HCV AB SER QL: < 0 INDEX (ref ?–0.8)

## 2018-05-31 ENCOUNTER — HOSPITAL ENCOUNTER (OUTPATIENT)
Dept: HOSPITAL 53 - M LAB | Age: 68
End: 2018-05-31
Payer: MEDICARE

## 2018-05-31 DIAGNOSIS — E83.52: ICD-10-CM

## 2018-05-31 DIAGNOSIS — E66.3: ICD-10-CM

## 2018-05-31 DIAGNOSIS — N39.0: Primary | ICD-10-CM

## 2018-05-31 DIAGNOSIS — D86.9: ICD-10-CM

## 2018-05-31 DIAGNOSIS — I10: ICD-10-CM

## 2018-05-31 LAB
ALBUMIN/GLOBULIN RATIO: 0.87 (ref 1–1.93)
ALBUMIN: 3.3 GM/DL (ref 3.2–5.2)
ALKALINE PHOSPHATASE: 72 U/L (ref 45–117)
ALT SERPL W P-5'-P-CCNC: 21 U/L (ref 12–78)
ANION GAP: 7 MEQ/L (ref 8–16)
APPEARANCE, URINE: (no result)
AST SERPL-CCNC: 12 U/L (ref 7–37)
BACTERIA UR QL AUTO: NEGATIVE
BASO #: 0.1 10^3/UL (ref 0–0.2)
BASO %: 0.6 % (ref 0–1)
BILIRUBIN, URINE AUTO: NEGATIVE
BILIRUBIN,TOTAL: 0.7 MG/DL (ref 0.2–1)
BLOOD UREA NITROGEN: 10 MG/DL (ref 7–18)
BLOOD, URINE BLOOD: NEGATIVE
CALCIUM LEVEL: 8.8 MG/DL (ref 8.8–10.2)
CARBON DIOXIDE LEVEL: 28 MEQ/L (ref 21–32)
CHLORIDE LEVEL: 107 MEQ/L (ref 98–107)
CREATININE FOR GFR: 0.65 MG/DL (ref 0.55–1.3)
EOS #: 0.2 10^3/UL (ref 0–0.5)
EOSINOPHIL NFR BLD AUTO: 2 % (ref 0–3)
GFR SERPL CREATININE-BSD FRML MDRD: > 60 ML/MIN/{1.73_M2} (ref 45–?)
GLUCOSE, FASTING: 108 MG/DL (ref 70–100)
GLUCOSE, URINE (UA) AUTO: NEGATIVE MG/DL
HEMATOCRIT: 41.5 % (ref 36–47)
HEMOGLOBIN: 13.8 G/DL (ref 12–15.5)
IMMATURE GRANULOCYTE %: 0.4 % (ref 0–3)
KETONE, URINE AUTO: NEGATIVE MG/DL
LEUKOCYTE ESTERASE UR QL STRIP.AUTO: (no result)
LYMPH #: 1.6 10^3/UL (ref 1.5–4.5)
LYMPH %: 19 % (ref 24–44)
MAGNESIUM LEVEL: 2.2 MG/DL (ref 1.8–2.4)
MEAN CORPUSCULAR HEMOGLOBIN: 29.1 PG (ref 27–33)
MEAN CORPUSCULAR HGB CONC: 33.3 G/DL (ref 32–36.5)
MEAN CORPUSCULAR VOLUME: 87.6 FL (ref 80–96)
MONO #: 0.8 10^3/UL (ref 0–0.8)
MONO %: 9.8 % (ref 0–5)
NEUTROPHILS #: 5.8 10^3/UL (ref 1.8–7.7)
NEUTROPHILS %: 68.2 % (ref 36–66)
NITRITE, URINE AUTO: NEGATIVE
NRBC BLD AUTO-RTO: 0 % (ref 0–0)
PH,URINE: 7 UNITS (ref 5–9)
PLATELET COUNT, AUTOMATED: 272 10^3/UL (ref 150–450)
POTASSIUM SERUM: 3.7 MEQ/L (ref 3.5–5.1)
PROT UR QL STRIP.AUTO: NEGATIVE MG/DL
RBC, URINE AUTO: 2 /HPF (ref 0–3)
RED BLOOD COUNT: 4.74 10^6/UL (ref 4–5.4)
RED CELL DISTRIBUTION WIDTH: 13 % (ref 11.5–14.5)
SODIUM LEVEL: 142 MEQ/L (ref 136–145)
SPECIFIC GRAVITY URINE AUTO: 1.01 (ref 1–1.03)
SQUAMOUS #/AREA URNS AUTO: 6 /HPF (ref 0–6)
TOTAL PROTEIN: 7.1 GM/DL (ref 6.4–8.2)
UROBILINOGEN, URINE AUTO: 0.2 MG/DL (ref 0–2)
WBC, URINE AUTO: 10 /HPF (ref 0–3)
WHITE BLOOD COUNT: 8.5 10^3/UL (ref 4–10)

## 2018-05-31 PROCEDURE — 83735 ASSAY OF MAGNESIUM: CPT

## 2018-12-05 ENCOUNTER — HOSPITAL ENCOUNTER (OUTPATIENT)
Dept: HOSPITAL 53 - M LAB | Age: 68
End: 2018-12-05
Attending: NURSE PRACTITIONER
Payer: MEDICARE

## 2018-12-05 DIAGNOSIS — I10: Primary | ICD-10-CM

## 2018-12-05 DIAGNOSIS — Z13.9: ICD-10-CM

## 2018-12-05 LAB
25(OH)D3 SERPL-MCNC: 17.9 NG/ML (ref 30–100)
ALBUMIN/GLOBULIN RATIO: 0.8 (ref 1–1.93)
ALBUMIN: 3.3 GM/DL (ref 3.2–5.2)
ALKALINE PHOSPHATASE: 83 U/L (ref 45–117)
ALT SERPL W P-5'-P-CCNC: 29 U/L (ref 12–78)
ANION GAP: 8 MEQ/L (ref 8–16)
AST SERPL-CCNC: 15 U/L (ref 7–37)
BASO #: 0.1 10^3/UL (ref 0–0.2)
BASO %: 0.7 % (ref 0–1)
BILIRUBIN,TOTAL: 0.8 MG/DL (ref 0.2–1)
BLOOD UREA NITROGEN: 16 MG/DL (ref 7–18)
CALCIUM LEVEL: 8.8 MG/DL (ref 8.8–10.2)
CARBON DIOXIDE LEVEL: 28 MEQ/L (ref 21–32)
CHLORIDE LEVEL: 105 MEQ/L (ref 98–107)
CHOLEST SERPL-MCNC: 227 MG/DL (ref ?–200)
CHOLESTEROL RISK RATIO: 2.87 (ref ?–5)
CREATININE FOR GFR: 0.86 MG/DL (ref 0.55–1.3)
EOS #: 0.3 10^3/UL (ref 0–0.5)
EOSINOPHIL NFR BLD AUTO: 3.1 % (ref 0–3)
EST. AVERAGE GLUCOSE BLD GHB EST-MCNC: 154 MG/DL (ref 60–110)
FOLATE SERPL-MCNC: 9.9 NG/ML
GFR SERPL CREATININE-BSD FRML MDRD: > 60 ML/MIN/{1.73_M2} (ref 45–?)
GLUCOSE, FASTING: 113 MG/DL (ref 70–100)
HDLC SERPL-MCNC: 79 MG/DL (ref 40–?)
HEMATOCRIT: 41.3 % (ref 36–47)
HEMOGLOBIN: 13.6 G/DL (ref 12–15.5)
IMMATURE GRANULOCYTE %: 0.2 % (ref 0–3)
LDL CHOLESTEROL: 125 MG/DL (ref ?–100)
LYMPH #: 1.7 10^3/UL (ref 1.5–4.5)
LYMPH %: 20.4 % (ref 24–44)
MAGNESIUM LEVEL: 1.9 MG/DL (ref 1.8–2.4)
MEAN CORPUSCULAR HEMOGLOBIN: 28.2 PG (ref 27–33)
MEAN CORPUSCULAR HGB CONC: 32.9 G/DL (ref 32–36.5)
MEAN CORPUSCULAR VOLUME: 85.7 FL (ref 80–96)
MONO #: 0.9 10^3/UL (ref 0–0.8)
MONO %: 11 % (ref 0–5)
NEUTROPHILS #: 5.2 10^3/UL (ref 1.8–7.7)
NEUTROPHILS %: 64.6 % (ref 36–66)
NONHDLC SERPL-MCNC: 148 MG/DL
NRBC BLD AUTO-RTO: 0 % (ref 0–0)
PLATELET COUNT, AUTOMATED: 254 10^3/UL (ref 150–450)
POTASSIUM SERUM: 3.6 MEQ/L (ref 3.5–5.1)
RED BLOOD COUNT: 4.82 10^6/UL (ref 4–5.4)
RED CELL DISTRIBUTION WIDTH: 13.8 % (ref 11.5–14.5)
SODIUM LEVEL: 141 MEQ/L (ref 136–145)
THYROID STIMULATING HORMONE: 3.48 UIU/ML (ref 0.36–3.74)
TOTAL PROTEIN: 7.4 GM/DL (ref 6.4–8.2)
TRIGLYCERIDES LEVEL: 115 MG/DL (ref ?–150)
VIT B12 SERPL-MCNC: 961 PG/ML
WHITE BLOOD COUNT: 8.1 10^3/UL (ref 4–10)

## 2018-12-05 PROCEDURE — 82746 ASSAY OF FOLIC ACID SERUM: CPT

## 2019-03-09 ENCOUNTER — HOSPITAL ENCOUNTER (OUTPATIENT)
Dept: HOSPITAL 53 - M LAB | Age: 69
End: 2019-03-09
Attending: NURSE PRACTITIONER
Payer: MEDICARE

## 2019-03-09 DIAGNOSIS — E87.6: ICD-10-CM

## 2019-03-09 DIAGNOSIS — I10: ICD-10-CM

## 2019-03-09 DIAGNOSIS — E78.5: ICD-10-CM

## 2019-03-09 DIAGNOSIS — Z13.9: Primary | ICD-10-CM

## 2019-03-09 DIAGNOSIS — E11.9: ICD-10-CM

## 2019-03-09 DIAGNOSIS — E83.52: ICD-10-CM

## 2019-03-09 LAB
ALBUMIN SERPL BCG-MCNC: 3.3 GM/DL (ref 3.2–5.2)
ALT SERPL W P-5'-P-CCNC: 28 U/L (ref 12–78)
BASOPHILS # BLD AUTO: 0.1 10^3/UL (ref 0–0.2)
BASOPHILS NFR BLD AUTO: 0.7 % (ref 0–1)
BILIRUB SERPL-MCNC: 0.6 MG/DL (ref 0.2–1)
BUN SERPL-MCNC: 15 MG/DL (ref 7–18)
CALCIUM SERPL-MCNC: 8.7 MG/DL (ref 8.8–10.2)
CHLORIDE SERPL-SCNC: 103 MEQ/L (ref 98–107)
CHOLEST SERPL-MCNC: 206 MG/DL (ref ?–200)
CHOLEST/HDLC SERPL: 2.94 {RATIO} (ref ?–5)
CO2 SERPL-SCNC: 30 MEQ/L (ref 21–32)
CREAT SERPL-MCNC: 0.8 MG/DL (ref 0.55–1.3)
EOSINOPHIL # BLD AUTO: 0.2 10^3/UL (ref 0–0.5)
EOSINOPHIL NFR BLD AUTO: 2.2 % (ref 0–3)
EST. AVERAGE GLUCOSE BLD GHB EST-MCNC: 137 MG/DL (ref 60–110)
GFR SERPL CREATININE-BSD FRML MDRD: > 60 ML/MIN/{1.73_M2} (ref 45–?)
GLUCOSE SERPL-MCNC: 94 MG/DL (ref 70–100)
HCT VFR BLD AUTO: 41.7 % (ref 36–47)
HDLC SERPL-MCNC: 70 MG/DL (ref 40–?)
HGB BLD-MCNC: 13.7 G/DL (ref 12–15.5)
LDLC SERPL CALC-MCNC: 96 MG/DL (ref ?–100)
LYMPHOCYTES # BLD AUTO: 1.5 10^3/UL (ref 1.5–4.5)
LYMPHOCYTES NFR BLD AUTO: 18.4 % (ref 24–44)
MCH RBC QN AUTO: 28.7 PG (ref 27–33)
MCHC RBC AUTO-ENTMCNC: 32.9 G/DL (ref 32–36.5)
MCV RBC AUTO: 87.4 FL (ref 80–96)
MONOCYTES # BLD AUTO: 1 10^3/UL (ref 0–0.8)
MONOCYTES NFR BLD AUTO: 12 % (ref 0–5)
NEUTROPHILS # BLD AUTO: 5.6 10^3/UL (ref 1.8–7.7)
NEUTROPHILS NFR BLD AUTO: 66.6 % (ref 36–66)
NONHDLC SERPL-MCNC: 136 MG/DL
PLATELET # BLD AUTO: 241 10^3/UL (ref 150–450)
POTASSIUM SERPL-SCNC: 3.5 MEQ/L (ref 3.5–5.1)
PROT SERPL-MCNC: 7.4 GM/DL (ref 6.4–8.2)
RBC # BLD AUTO: 4.77 10^6/UL (ref 4–5.4)
SODIUM SERPL-SCNC: 140 MEQ/L (ref 136–145)
TRIGL SERPL-MCNC: 198 MG/DL (ref ?–150)
TSH SERPL DL<=0.005 MIU/L-ACNC: 2.87 UIU/ML (ref 0.36–3.74)
WBC # BLD AUTO: 8.4 10^3/UL (ref 4–10)

## 2019-03-22 ENCOUNTER — HOSPITAL ENCOUNTER (OUTPATIENT)
Dept: HOSPITAL 53 - M LAB | Age: 69
End: 2019-03-22
Attending: PHYSICIAN ASSISTANT
Payer: MEDICARE

## 2019-03-22 DIAGNOSIS — I11.9: Primary | ICD-10-CM

## 2019-03-26 LAB — ALDOST SERPL-MCNC: 17.3 NG/DL (ref 0–30)

## 2019-03-27 ENCOUNTER — HOSPITAL ENCOUNTER (OUTPATIENT)
Dept: HOSPITAL 53 - M LAB REF | Age: 69
End: 2019-03-27
Attending: PHYSICIAN ASSISTANT
Payer: MEDICARE

## 2019-03-27 DIAGNOSIS — I11.9: Primary | ICD-10-CM

## 2019-03-30 LAB — RENIN PLAS-CCNC: <0.167 NG/ML/HR (ref 0.17–5.38)

## 2019-04-01 LAB
DOPAMINE 24H UR-MRATE: 451 UG/24 HR (ref 0–510)
DOPAMINE UR-MCNC: 196 UG/L
EPINEPH 24H UR-MRATE: 2 UG/24 HR (ref 0–20)
EPINEPH UR-MCNC: 1 UG/L
METANEPH 24H UR-MRATE: 85 UG/24 HR (ref 45–290)
METANEPHS 24H UR-MCNC: 37 UG/L
NOREPINEPH 24H UR-MRATE: 58 UG/24 HR (ref 0–135)
NOREPINEPH UR-MCNC: 25 UG/L
NORMETANEPHRINE 24H UR-MCNC: 218 UG/L
NORMETANEPHRINE 24H UR-MRATE: 501 UG/24 HR (ref 82–500)

## 2019-08-23 ENCOUNTER — HOSPITAL ENCOUNTER (OUTPATIENT)
Dept: HOSPITAL 53 - M LAB | Age: 69
End: 2019-08-23
Attending: INTERNAL MEDICINE
Payer: MEDICARE

## 2019-08-23 DIAGNOSIS — E11.9: Primary | ICD-10-CM

## 2019-08-23 DIAGNOSIS — E78.5: ICD-10-CM

## 2019-08-23 LAB
CHOLEST SERPL-MCNC: 188 MG/DL (ref ?–200)
CHOLEST/HDLC SERPL: 2.44 {RATIO} (ref ?–5)
EST. AVERAGE GLUCOSE BLD GHB EST-MCNC: 148 MG/DL (ref 60–110)
HCT VFR BLD AUTO: 43.9 % (ref 36–47)
HDLC SERPL-MCNC: 77 MG/DL (ref 40–?)
HGB BLD-MCNC: 14.4 G/DL (ref 12–15.5)
LDLC SERPL CALC-MCNC: 93 MG/DL (ref ?–100)
MCH RBC QN AUTO: 29.2 PG (ref 27–33)
MCHC RBC AUTO-ENTMCNC: 32.8 G/DL (ref 32–36.5)
MCV RBC AUTO: 89 FL (ref 80–96)
NONHDLC SERPL-MCNC: 111 MG/DL
PLATELET # BLD AUTO: 218 10^3/UL (ref 150–450)
RBC # BLD AUTO: 4.93 10^6/UL (ref 4–5.4)
TRIGL SERPL-MCNC: 90 MG/DL (ref ?–150)
WBC # BLD AUTO: 7.8 10^3/UL (ref 4–10)

## 2020-02-04 ENCOUNTER — HOSPITAL ENCOUNTER (OUTPATIENT)
Dept: HOSPITAL 53 - M LAB | Age: 70
End: 2020-02-04
Attending: INTERNAL MEDICINE
Payer: MEDICARE

## 2020-02-04 DIAGNOSIS — E78.5: Primary | ICD-10-CM

## 2020-02-04 DIAGNOSIS — E11.9: ICD-10-CM

## 2020-02-04 LAB
ALBUMIN SERPL BCG-MCNC: 3.7 GM/DL (ref 3.2–5.2)
ALT SERPL W P-5'-P-CCNC: 20 U/L (ref 12–78)
BILIRUB SERPL-MCNC: 0.7 MG/DL (ref 0.2–1)
BUN SERPL-MCNC: 13 MG/DL (ref 7–18)
CALCIUM SERPL-MCNC: 9.2 MG/DL (ref 8.8–10.2)
CHLORIDE SERPL-SCNC: 106 MEQ/L (ref 98–107)
CO2 SERPL-SCNC: 31 MEQ/L (ref 21–32)
CREAT SERPL-MCNC: 0.82 MG/DL (ref 0.55–1.3)
EST. AVERAGE GLUCOSE BLD GHB EST-MCNC: 146 MG/DL (ref 60–110)
GFR SERPL CREATININE-BSD FRML MDRD: > 60 ML/MIN/{1.73_M2} (ref 45–?)
GLUCOSE SERPL-MCNC: 98 MG/DL (ref 70–100)
POTASSIUM SERPL-SCNC: 3.9 MEQ/L (ref 3.5–5.1)
PROT SERPL-MCNC: 8.1 GM/DL (ref 6.4–8.2)
SODIUM SERPL-SCNC: 140 MEQ/L (ref 136–145)

## 2020-06-08 ENCOUNTER — HOSPITAL ENCOUNTER (OUTPATIENT)
Dept: HOSPITAL 53 - M LAB | Age: 70
End: 2020-06-08
Attending: INTERNAL MEDICINE
Payer: MEDICARE

## 2020-06-08 DIAGNOSIS — E78.5: ICD-10-CM

## 2020-06-08 DIAGNOSIS — E11.9: ICD-10-CM

## 2020-06-08 DIAGNOSIS — I10: Primary | ICD-10-CM

## 2020-06-08 LAB
ALBUMIN SERPL BCG-MCNC: 3.5 GM/DL (ref 3.2–5.2)
ALT SERPL W P-5'-P-CCNC: 30 U/L (ref 12–78)
BASOPHILS # BLD AUTO: 0.1 10^3/UL (ref 0–0.2)
BASOPHILS NFR BLD AUTO: 0.7 % (ref 0–1)
BILIRUB SERPL-MCNC: 0.7 MG/DL (ref 0.2–1)
BUN SERPL-MCNC: 14 MG/DL (ref 7–18)
CALCIUM SERPL-MCNC: 8.7 MG/DL (ref 8.8–10.2)
CHLORIDE SERPL-SCNC: 106 MEQ/L (ref 98–107)
CHOLEST SERPL-MCNC: 236 MG/DL (ref ?–200)
CHOLEST/HDLC SERPL: 2.54 {RATIO} (ref ?–5)
CO2 SERPL-SCNC: 27 MEQ/L (ref 21–32)
CREAT SERPL-MCNC: 0.89 MG/DL (ref 0.55–1.3)
EOSINOPHIL # BLD AUTO: 0.1 10^3/UL (ref 0–0.5)
EOSINOPHIL NFR BLD AUTO: 1.6 % (ref 0–3)
EST. AVERAGE GLUCOSE BLD GHB EST-MCNC: 146 MG/DL (ref 60–110)
GFR SERPL CREATININE-BSD FRML MDRD: > 60 ML/MIN/{1.73_M2} (ref 45–?)
GLUCOSE SERPL-MCNC: 124 MG/DL (ref 70–100)
HCT VFR BLD AUTO: 43.6 % (ref 36–47)
HDLC SERPL-MCNC: 93 MG/DL (ref 40–?)
HGB BLD-MCNC: 14.2 G/DL (ref 12–15.5)
LDLC SERPL CALC-MCNC: 123 MG/DL (ref ?–100)
LYMPHOCYTES # BLD AUTO: 2.1 10^3/UL (ref 1.5–5)
LYMPHOCYTES NFR BLD AUTO: 23.8 % (ref 24–44)
MCH RBC QN AUTO: 29 PG (ref 27–33)
MCHC RBC AUTO-ENTMCNC: 32.6 G/DL (ref 32–36.5)
MCV RBC AUTO: 89 FL (ref 80–96)
MONOCYTES # BLD AUTO: 0.8 10^3/UL (ref 0–0.8)
MONOCYTES NFR BLD AUTO: 9.4 % (ref 0–5)
NEUTROPHILS # BLD AUTO: 5.6 10^3/UL (ref 1.5–8.5)
NEUTROPHILS NFR BLD AUTO: 64.2 % (ref 36–66)
NONHDLC SERPL-MCNC: 143 MG/DL
PLATELET # BLD AUTO: 234 10^3/UL (ref 150–450)
POTASSIUM SERPL-SCNC: 3.7 MEQ/L (ref 3.5–5.1)
PROT SERPL-MCNC: 7.9 GM/DL (ref 6.4–8.2)
RBC # BLD AUTO: 4.9 10^6/UL (ref 4–5.4)
SODIUM SERPL-SCNC: 140 MEQ/L (ref 136–145)
TRIGL SERPL-MCNC: 102 MG/DL (ref ?–150)
TSH SERPL DL<=0.005 MIU/L-ACNC: 6.1 UIU/ML (ref 0.36–3.74)
WBC # BLD AUTO: 8.7 10^3/UL (ref 4–10)

## 2020-07-06 ENCOUNTER — HOSPITAL ENCOUNTER (OUTPATIENT)
Dept: HOSPITAL 53 - M LAB | Age: 70
End: 2020-07-06
Attending: PHYSICIAN ASSISTANT
Payer: MEDICARE

## 2020-07-06 DIAGNOSIS — I11.9: Primary | ICD-10-CM

## 2020-07-06 LAB
BUN SERPL-MCNC: 14 MG/DL (ref 7–18)
CALCIUM SERPL-MCNC: 9.1 MG/DL (ref 8.8–10.2)
CHLORIDE SERPL-SCNC: 107 MEQ/L (ref 98–107)
CO2 SERPL-SCNC: 28 MEQ/L (ref 21–32)
CREAT SERPL-MCNC: 0.88 MG/DL (ref 0.55–1.3)
GFR SERPL CREATININE-BSD FRML MDRD: > 60 ML/MIN/{1.73_M2} (ref 45–?)
GLUCOSE SERPL-MCNC: 96 MG/DL (ref 70–100)
POTASSIUM SERPL-SCNC: 4 MEQ/L (ref 3.5–5.1)
SODIUM SERPL-SCNC: 138 MEQ/L (ref 136–145)

## 2020-09-22 ENCOUNTER — HOSPITAL ENCOUNTER (OUTPATIENT)
Dept: HOSPITAL 53 - M LAB | Age: 70
End: 2020-09-22
Attending: INTERNAL MEDICINE
Payer: MEDICARE

## 2020-09-22 DIAGNOSIS — I10: Primary | ICD-10-CM

## 2020-09-22 LAB
ALBUMIN SERPL BCG-MCNC: 3.4 GM/DL (ref 3.2–5.2)
ALT SERPL W P-5'-P-CCNC: 25 U/L (ref 12–78)
BASOPHILS # BLD AUTO: 0 10^3/UL (ref 0–0.2)
BASOPHILS NFR BLD AUTO: 0.4 % (ref 0–1)
BILIRUB SERPL-MCNC: 0.7 MG/DL (ref 0.2–1)
BUN SERPL-MCNC: 13 MG/DL (ref 7–18)
CALCIUM SERPL-MCNC: 9.2 MG/DL (ref 8.8–10.2)
CHLORIDE SERPL-SCNC: 105 MEQ/L (ref 98–107)
CHOLEST SERPL-MCNC: 239 MG/DL (ref ?–200)
CHOLEST/HDLC SERPL: 2.75 {RATIO} (ref ?–5)
CO2 SERPL-SCNC: 28 MEQ/L (ref 21–32)
CREAT SERPL-MCNC: 0.81 MG/DL (ref 0.55–1.3)
EOSINOPHIL # BLD AUTO: 0.2 10^3/UL (ref 0–0.5)
EOSINOPHIL NFR BLD AUTO: 1.9 % (ref 0–3)
GFR SERPL CREATININE-BSD FRML MDRD: > 60 ML/MIN/{1.73_M2} (ref 45–?)
GLUCOSE SERPL-MCNC: 107 MG/DL (ref 70–100)
HCT VFR BLD AUTO: 39.8 % (ref 36–47)
HDLC SERPL-MCNC: 87 MG/DL (ref 40–?)
HGB BLD-MCNC: 13.2 G/DL (ref 12–15.5)
LDLC SERPL CALC-MCNC: 112 MG/DL (ref ?–100)
LYMPHOCYTES # BLD AUTO: 2.5 10^3/UL (ref 1.5–5)
LYMPHOCYTES NFR BLD AUTO: 26.4 % (ref 24–44)
MCH RBC QN AUTO: 29.1 PG (ref 27–33)
MCHC RBC AUTO-ENTMCNC: 33.2 G/DL (ref 32–36.5)
MCV RBC AUTO: 87.7 FL (ref 80–96)
MONOCYTES # BLD AUTO: 1 10^3/UL (ref 0–0.8)
MONOCYTES NFR BLD AUTO: 10.4 % (ref 0–5)
NEUTROPHILS # BLD AUTO: 5.7 10^3/UL (ref 1.5–8.5)
NEUTROPHILS NFR BLD AUTO: 60.5 % (ref 36–66)
NONHDLC SERPL-MCNC: 152 MG/DL
PLATELET # BLD AUTO: 233 10^3/UL (ref 150–450)
POTASSIUM SERPL-SCNC: 3.8 MEQ/L (ref 3.5–5.1)
PROT SERPL-MCNC: 7.6 GM/DL (ref 6.4–8.2)
RBC # BLD AUTO: 4.54 10^6/UL (ref 4–5.4)
SODIUM SERPL-SCNC: 138 MEQ/L (ref 136–145)
TRIGL SERPL-MCNC: 200 MG/DL (ref ?–150)
WBC # BLD AUTO: 9.4 10^3/UL (ref 4–10)

## 2020-09-30 ENCOUNTER — HOSPITAL ENCOUNTER (OUTPATIENT)
Dept: HOSPITAL 53 - M RAD | Age: 70
End: 2020-09-30
Attending: INTERNAL MEDICINE
Payer: MEDICARE

## 2020-09-30 DIAGNOSIS — K76.0: ICD-10-CM

## 2020-09-30 DIAGNOSIS — E26.89: Primary | ICD-10-CM

## 2020-09-30 DIAGNOSIS — K57.90: ICD-10-CM

## 2020-10-07 NOTE — REP
CT ABDOMEN AND PELVIS WITHOUT INTRAVENOUS (IV) OR ORAL CONTRAST 



HISTORY: Rule out adrenal mass. Other hyperaldosteronism.



TECHNIQUE: This study was ordered with IV contrast, but the patient declined and
thus was done without IV contrast. 



COMPARISON: CT abdomen from 12/21/2017. 



CT FINDINGS: 

Preliminary digital  radiograph shows an unremarkable bowel gas pattern. 
The lung bases are clear on axial CT images. There is no evidence of pleural 
effusion or upper abdominal ascites. There is minimal diffuse fatty infiltration
of the liver. No abnormality noted in the spleen. There is a somewhat modeled 
density pattern in the lumen of the gallbladder consistent with cholelithiasis. 
There is pancolonic and rather advanced diffuse colonic diverticulosis. No CT 
evidence of diverticulitis is seen. The kidneys are morphologically intact. 
Bilaterally normal adrenal glands are observed. No periaortic mass or adenopathy
is seen. The aorta is normal in caliber. A normal appendix is seen medial to the
cecum in the central abdomen. No uterine or ovarian abnormality is appreciated. 
Urinary bladder is unremarkable. No abdominal wall defect is seen. Bone window 
settings show no bony destructive lesion. There are degenerative spondylosis 
changes in the lumbar spine.   



IMPRESSION: 

Adrenal glands are morphologically normal. Cholelithiasis is suspected. Minimal 
fatty infiltration of the liver. Pancolonic and advanced diverticulosis without 
CT evidence of diverticulitis. 

MTDD

## 2021-04-20 ENCOUNTER — HOSPITAL ENCOUNTER (OUTPATIENT)
Dept: HOSPITAL 53 - M LAB | Age: 71
End: 2021-04-20
Attending: INTERNAL MEDICINE
Payer: MEDICARE

## 2021-04-20 DIAGNOSIS — E78.5: Primary | ICD-10-CM

## 2021-04-20 DIAGNOSIS — E11.9: ICD-10-CM

## 2021-04-20 LAB
ALBUMIN SERPL BCG-MCNC: 3.5 GM/DL (ref 3.2–5.2)
ALT SERPL W P-5'-P-CCNC: 26 U/L (ref 12–78)
BILIRUB SERPL-MCNC: 0.5 MG/DL (ref 0.2–1)
BUN SERPL-MCNC: 14 MG/DL (ref 7–18)
CALCIUM SERPL-MCNC: 9.1 MG/DL (ref 8.8–10.2)
CHLORIDE SERPL-SCNC: 105 MEQ/L (ref 98–107)
CO2 SERPL-SCNC: 28 MEQ/L (ref 21–32)
CREAT SERPL-MCNC: 0.73 MG/DL (ref 0.55–1.3)
EST. AVERAGE GLUCOSE BLD GHB EST-MCNC: 128 MG/DL (ref 60–110)
GFR SERPL CREATININE-BSD FRML MDRD: > 60 ML/MIN/{1.73_M2} (ref 39–?)
GLUCOSE SERPL-MCNC: 86 MG/DL (ref 70–100)
POTASSIUM SERPL-SCNC: 3.9 MEQ/L (ref 3.5–5.1)
PROT SERPL-MCNC: 7.7 GM/DL (ref 6.4–8.2)
SODIUM SERPL-SCNC: 140 MEQ/L (ref 136–145)

## 2021-11-05 ENCOUNTER — HOSPITAL ENCOUNTER (OUTPATIENT)
Dept: HOSPITAL 53 - M RAD | Age: 71
End: 2021-11-05
Attending: INTERNAL MEDICINE
Payer: MEDICARE

## 2021-11-05 DIAGNOSIS — J84.9: ICD-10-CM

## 2021-11-05 DIAGNOSIS — K82.8: ICD-10-CM

## 2021-11-05 DIAGNOSIS — I51.7: ICD-10-CM

## 2021-11-05 DIAGNOSIS — D86.9: Primary | ICD-10-CM

## 2021-11-05 DIAGNOSIS — M25.78: ICD-10-CM

## 2021-11-05 DIAGNOSIS — M47.814: ICD-10-CM

## 2021-11-05 NOTE — REP
INDICATION:

SARCOIDOSIS.



COMPARISON:

03/14/2018, 04/20/2017 CT



TECHNIQUE:

Noncontrast scanning through the chest with coronal and sagittal reconstructions.



FINDINGS:

The lung fields remain well inflated and with some mild cylindrical bronchiectatic

changes.  They remain clear in the diffuse nodular and interstitial infiltrates on the

previous CT in 2017 had cleared by 2018 and have remained so.  No effusion, calcified

pleural plaque, parenchymal mass or new suspicious nodules.  Heart is mildly enlarged

with left atrial and ventricular enlargement.  No pericardial thickening or effusion.

Precarinal, right paratracheal, AP window and some pre-vascular adenopathy again seen

and unchanged.  The hilar nodes are more difficult to assess in the absence of

contrast but no significant change seen there.  No pathologic sized axillary or

supraclavicular nodes.  Diffuse thoracic spondylosis with marginal osteophytes.  No

compression deformity or destructive lesions.  Visualized ribs, clavicles, scapulae,

humeral heads and the sternum/manubrium were grossly intact.  Upper abdominal

structures show that portion of liver grossly intact without hepatomegaly or focal

mass.  The gallbladder shows hyperdense bile or sludge.  No calcified stone.  Spleen

not enlarged.  Adrenal glands normal.  Upper poles kidneys intact.  Pancreas

unremarkable.  Diverticulosis of the visible colon without diverticulitis



IMPRESSION:

1. Mediastinal and hilar adenopathy unchanged from the 03/14/2018 study.

2. No recurrence of the nodular interstitial infiltrates which had resolved between

the 2017 and 2018 CT examinations.  Lungs essentially clear and stable.

3. Some cardiomegaly again noted.

4. Biliary sludge or dense bile noted without calcified stone.  No other significant

finding





<Electronically signed by Danny Lancaster > 11/05/21 [id number]

## 2021-11-30 ENCOUNTER — HOSPITAL ENCOUNTER (OUTPATIENT)
Dept: HOSPITAL 53 - M LAB | Age: 71
End: 2021-11-30
Payer: MEDICARE

## 2021-11-30 DIAGNOSIS — E87.6: ICD-10-CM

## 2021-11-30 DIAGNOSIS — I12.9: ICD-10-CM

## 2021-11-30 DIAGNOSIS — D86.9: Primary | ICD-10-CM

## 2021-11-30 DIAGNOSIS — N18.30: ICD-10-CM

## 2021-11-30 DIAGNOSIS — N39.0: ICD-10-CM

## 2021-11-30 DIAGNOSIS — E66.3: ICD-10-CM

## 2021-11-30 DIAGNOSIS — E83.52: ICD-10-CM

## 2021-11-30 LAB
APPEARANCE UR: CLEAR
BACTERIA UR QL AUTO: NEGATIVE
BASOPHILS # BLD AUTO: 0.1 10^3/UL (ref 0–0.2)
BASOPHILS NFR BLD AUTO: 0.6 % (ref 0–1)
BILIRUB UR QL STRIP.AUTO: NEGATIVE
EOSINOPHIL # BLD AUTO: 0.2 10^3/UL (ref 0–0.5)
EOSINOPHIL NFR BLD AUTO: 2 % (ref 0–3)
GLUCOSE UR QL STRIP.AUTO: NEGATIVE MG/DL
HCT VFR BLD AUTO: 39.8 % (ref 36–47)
HGB BLD-MCNC: 12.9 G/DL (ref 12–15.5)
HGB UR QL STRIP.AUTO: NEGATIVE
KETONES UR QL STRIP.AUTO: (no result) MG/DL
LEUKOCYTE ESTERASE UR QL STRIP.AUTO: (no result)
LYMPHOCYTES # BLD AUTO: 1.9 10^3/UL (ref 1.5–5)
LYMPHOCYTES NFR BLD AUTO: 21 % (ref 24–44)
MCH RBC QN AUTO: 28.9 PG (ref 27–33)
MCHC RBC AUTO-ENTMCNC: 32.4 G/DL (ref 32–36.5)
MCV RBC AUTO: 89 FL (ref 80–96)
MONOCYTES # BLD AUTO: 1 10^3/UL (ref 0–0.8)
MONOCYTES NFR BLD AUTO: 11.4 % (ref 2–8)
NEUTROPHILS # BLD AUTO: 5.8 10^3/UL (ref 1.5–8.5)
NEUTROPHILS NFR BLD AUTO: 64.7 % (ref 36–66)
NITRITE UR QL STRIP.AUTO: NEGATIVE
PH UR STRIP.AUTO: 6 UNITS (ref 5–9)
PLATELET # BLD AUTO: 224 10^3/UL (ref 150–450)
PROT UR QL STRIP.AUTO: (no result) MG/DL
RBC # BLD AUTO: 4.47 10^6/UL (ref 4–5.4)
RBC # UR AUTO: 2 /HPF (ref 0–3)
SP GR UR STRIP.AUTO: 1.02 (ref 1–1.03)
SQUAMOUS #/AREA URNS AUTO: 2 /HPF (ref 0–6)
UROBILINOGEN UR QL STRIP.AUTO: 0.2 MG/DL (ref 0–2)
WBC # BLD AUTO: 9 10^3/UL (ref 4–10)
WBC #/AREA URNS AUTO: 9 /HPF (ref 0–3)

## 2021-12-03 LAB
METANEPH FREE SERPL-MCNC: 15.3 PG/ML (ref 0–88)
NORMETANEPHRINE SERPL-MCNC: 63.4 PG/ML (ref 0–285.2)

## 2021-12-16 ENCOUNTER — HOSPITAL ENCOUNTER (OUTPATIENT)
Dept: HOSPITAL 53 - M LAB | Age: 71
End: 2021-12-16
Payer: MEDICARE

## 2021-12-16 DIAGNOSIS — E83.52: ICD-10-CM

## 2021-12-16 DIAGNOSIS — D86.9: Primary | ICD-10-CM

## 2021-12-16 DIAGNOSIS — N18.30: ICD-10-CM

## 2021-12-16 DIAGNOSIS — N39.0: ICD-10-CM

## 2021-12-16 DIAGNOSIS — E87.6: ICD-10-CM

## 2021-12-16 DIAGNOSIS — I12.9: ICD-10-CM

## 2021-12-16 DIAGNOSIS — E66.3: ICD-10-CM

## 2021-12-16 LAB
ALBUMIN SERPL BCG-MCNC: 3.5 GM/DL (ref 3.2–5.2)
ALT SERPL W P-5'-P-CCNC: 25 U/L (ref 12–78)
APPEARANCE UR: CLEAR
BACTERIA UR QL AUTO: NEGATIVE
BASOPHILS # BLD AUTO: 0.1 10^3/UL (ref 0–0.2)
BASOPHILS NFR BLD AUTO: 0.6 % (ref 0–1)
BILIRUB SERPL-MCNC: 1.4 MG/DL (ref 0.2–1)
BILIRUB UR QL STRIP.AUTO: NEGATIVE
BUN SERPL-MCNC: 10 MG/DL (ref 7–18)
CALCIUM SERPL-MCNC: 9.5 MG/DL (ref 8.8–10.2)
CHLORIDE SERPL-SCNC: 106 MEQ/L (ref 98–107)
CO2 SERPL-SCNC: 31 MEQ/L (ref 21–32)
CREAT SERPL-MCNC: 0.71 MG/DL (ref 0.55–1.3)
EOSINOPHIL # BLD AUTO: 0.2 10^3/UL (ref 0–0.5)
EOSINOPHIL NFR BLD AUTO: 2 % (ref 0–3)
GFR SERPL CREATININE-BSD FRML MDRD: > 60 ML/MIN/{1.73_M2} (ref 39–?)
GLUCOSE SERPL-MCNC: 106 MG/DL (ref 70–100)
GLUCOSE UR QL STRIP.AUTO: NEGATIVE MG/DL
HCT VFR BLD AUTO: 40.7 % (ref 36–47)
HGB BLD-MCNC: 13.1 G/DL (ref 12–15.5)
HGB UR QL STRIP.AUTO: NEGATIVE
KETONES UR QL STRIP.AUTO: NEGATIVE MG/DL
LEUKOCYTE ESTERASE UR QL STRIP.AUTO: (no result)
LYMPHOCYTES # BLD AUTO: 1.8 10^3/UL (ref 1.5–5)
LYMPHOCYTES NFR BLD AUTO: 22 % (ref 24–44)
MCH RBC QN AUTO: 28.5 PG (ref 27–33)
MCHC RBC AUTO-ENTMCNC: 32.2 G/DL (ref 32–36.5)
MCV RBC AUTO: 88.7 FL (ref 80–96)
MONOCYTES # BLD AUTO: 0.7 10^3/UL (ref 0–0.8)
MONOCYTES NFR BLD AUTO: 8.9 % (ref 2–8)
NEUTROPHILS # BLD AUTO: 5.4 10^3/UL (ref 1.5–8.5)
NEUTROPHILS NFR BLD AUTO: 66.1 % (ref 36–66)
NITRITE UR QL STRIP.AUTO: NEGATIVE
PH UR STRIP.AUTO: 6 UNITS (ref 5–9)
PLATELET # BLD AUTO: 237 10^3/UL (ref 150–450)
POTASSIUM SERPL-SCNC: 3.6 MEQ/L (ref 3.5–5.1)
PROT SERPL-MCNC: 7.4 GM/DL (ref 6.4–8.2)
PROT UR QL STRIP.AUTO: NEGATIVE MG/DL
RBC # BLD AUTO: 4.59 10^6/UL (ref 4–5.4)
RBC # UR AUTO: 1 /HPF (ref 0–3)
SODIUM SERPL-SCNC: 142 MEQ/L (ref 136–145)
SP GR UR STRIP.AUTO: 1 (ref 1–1.03)
SQUAMOUS #/AREA URNS AUTO: 1 /HPF (ref 0–6)
URATE SERPL-MCNC: 5.2 MG/DL (ref 2.6–6)
UROBILINOGEN UR QL STRIP.AUTO: 0.2 MG/DL (ref 0–2)
WBC # BLD AUTO: 8.2 10^3/UL (ref 4–10)
WBC #/AREA URNS AUTO: 3 /HPF (ref 0–3)

## 2021-12-17 ENCOUNTER — HOSPITAL ENCOUNTER (OUTPATIENT)
Dept: HOSPITAL 53 - M RAD | Age: 71
End: 2021-12-17
Payer: MEDICAID

## 2021-12-17 DIAGNOSIS — I70.1: Primary | ICD-10-CM

## 2021-12-17 DIAGNOSIS — K80.20: ICD-10-CM

## 2021-12-23 LAB
METANEPH FREE SERPL-MCNC: 17.4 PG/ML (ref 0–88)
NORMETANEPHRINE SERPL-MCNC: 113.1 PG/ML (ref 0–285.2)

## 2021-12-28 LAB
DOPAMINE 24H UR-MRATE: 283 UG/24 HR (ref 0–510)
DOPAMINE UR-MCNC: 377 UG/L
EPINEPH 24H UR-MRATE: 5 UG/24 HR (ref 0–20)
EPINEPH UR-MCNC: 7 UG/L
NOREPINEPH 24H UR-MRATE: 62 UG/24 HR (ref 0–135)
NOREPINEPH UR-MCNC: 82 UG/L

## 2022-05-19 ENCOUNTER — HOSPITAL ENCOUNTER (OUTPATIENT)
Dept: HOSPITAL 53 - M LAB | Age: 72
End: 2022-05-19
Attending: INTERNAL MEDICINE
Payer: MEDICARE

## 2022-05-19 DIAGNOSIS — R73.01: Primary | ICD-10-CM

## 2022-05-19 DIAGNOSIS — E78.5: ICD-10-CM

## 2022-05-19 DIAGNOSIS — I10: ICD-10-CM

## 2022-05-19 LAB
ALBUMIN SERPL BCG-MCNC: 3.5 GM/DL (ref 3.2–5.2)
ALT SERPL W P-5'-P-CCNC: 21 U/L (ref 12–78)
BASOPHILS # BLD AUTO: 0.1 10^3/UL (ref 0–0.2)
BASOPHILS NFR BLD AUTO: 0.6 % (ref 0–1)
BILIRUB SERPL-MCNC: 0.8 MG/DL (ref 0.2–1)
BUN SERPL-MCNC: 12 MG/DL (ref 7–18)
CALCIUM SERPL-MCNC: 9.6 MG/DL (ref 8.8–10.2)
CHLORIDE SERPL-SCNC: 107 MEQ/L (ref 98–107)
CHOLEST SERPL-MCNC: 235 MG/DL (ref ?–200)
CHOLEST/HDLC SERPL: 2.55 {RATIO} (ref ?–5)
CO2 SERPL-SCNC: 28 MEQ/L (ref 21–32)
CREAT SERPL-MCNC: 0.72 MG/DL (ref 0.55–1.3)
EOSINOPHIL # BLD AUTO: 0.2 10^3/UL (ref 0–0.5)
EOSINOPHIL NFR BLD AUTO: 2.3 % (ref 0–3)
EST. AVERAGE GLUCOSE BLD GHB EST-MCNC: 128 MG/DL (ref 60–110)
GFR SERPL CREATININE-BSD FRML MDRD: > 60 ML/MIN/{1.73_M2} (ref 39–?)
GLUCOSE SERPL-MCNC: 110 MG/DL (ref 70–100)
HCT VFR BLD AUTO: 42.8 % (ref 36–47)
HDLC SERPL-MCNC: 92 MG/DL (ref 40–?)
HGB BLD-MCNC: 14.2 G/DL (ref 12–15.5)
LDLC SERPL CALC-MCNC: 124 MG/DL (ref ?–100)
LYMPHOCYTES # BLD AUTO: 1.8 10^3/UL (ref 1.5–5)
LYMPHOCYTES NFR BLD AUTO: 21.7 % (ref 24–44)
MCH RBC QN AUTO: 29.2 PG (ref 27–33)
MCHC RBC AUTO-ENTMCNC: 33.2 G/DL (ref 32–36.5)
MCV RBC AUTO: 87.9 FL (ref 80–96)
MONOCYTES # BLD AUTO: 0.7 10^3/UL (ref 0–0.8)
MONOCYTES NFR BLD AUTO: 8.6 % (ref 2–8)
NEUTROPHILS # BLD AUTO: 5.5 10^3/UL (ref 1.5–8.5)
NEUTROPHILS NFR BLD AUTO: 66.4 % (ref 36–66)
NONHDLC SERPL-MCNC: 143 MG/DL
PLATELET # BLD AUTO: 238 10^3/UL (ref 150–450)
POTASSIUM SERPL-SCNC: 3.7 MEQ/L (ref 3.5–5.1)
PROT SERPL-MCNC: 7.5 GM/DL (ref 6.4–8.2)
RBC # BLD AUTO: 4.87 10^6/UL (ref 4–5.4)
SODIUM SERPL-SCNC: 142 MEQ/L (ref 136–145)
TRIGL SERPL-MCNC: 95 MG/DL (ref ?–150)
WBC # BLD AUTO: 8.3 10^3/UL (ref 4–10)

## 2022-10-20 ENCOUNTER — HOSPITAL ENCOUNTER (OUTPATIENT)
Dept: HOSPITAL 53 - M LAB | Age: 72
End: 2022-10-20
Attending: PSYCHIATRY & NEUROLOGY
Payer: MEDICARE

## 2022-10-20 DIAGNOSIS — E11.9: Primary | ICD-10-CM

## 2022-10-20 DIAGNOSIS — E53.8: ICD-10-CM

## 2022-10-20 DIAGNOSIS — E55.9: ICD-10-CM

## 2022-10-20 DIAGNOSIS — E51.9: ICD-10-CM

## 2022-10-20 DIAGNOSIS — E78.5: ICD-10-CM

## 2022-10-20 LAB
25(OH)D3 SERPL-MCNC: 27.3 NG/ML (ref 30–100)
EST. AVERAGE GLUCOSE BLD GHB EST-MCNC: 128 MG/DL (ref 60–110)
VIT B12 SERPL-MCNC: 1002 PG/ML (ref 247–911)

## 2022-10-28 LAB
A-TOCOPHEROL VIT E SERPL-MCNC: 11 MG/L (ref 9–29)
GAMMA TOCOPHEROL SERPL-MCNC: 2.2 MG/L (ref 0.5–4.9)
PYRIDOXAL PHOS SERPL-MCNC: 6.2 UG/L (ref 3.4–65.2)
VIT B1 BLD-SCNC: 139.4 NMOL/L (ref 66.5–200)

## 2023-01-26 ENCOUNTER — HOSPITAL ENCOUNTER (OUTPATIENT)
Dept: HOSPITAL 53 - M RAD | Age: 73
End: 2023-01-26
Attending: INTERNAL MEDICINE
Payer: MEDICARE

## 2023-01-26 ENCOUNTER — HOSPITAL ENCOUNTER (OUTPATIENT)
Dept: HOSPITAL 53 - M LAB | Age: 73
End: 2023-01-26
Attending: PSYCHIATRY & NEUROLOGY
Payer: MEDICARE

## 2023-01-26 DIAGNOSIS — I63.9: Primary | ICD-10-CM

## 2023-01-26 DIAGNOSIS — E78.5: ICD-10-CM

## 2023-01-26 DIAGNOSIS — D51.9: ICD-10-CM

## 2023-01-26 DIAGNOSIS — E11.8: ICD-10-CM

## 2023-01-26 DIAGNOSIS — E51.9: ICD-10-CM

## 2023-01-26 DIAGNOSIS — E55.9: ICD-10-CM

## 2023-01-26 DIAGNOSIS — I67.9: Primary | ICD-10-CM

## 2023-01-26 DIAGNOSIS — I10: ICD-10-CM

## 2023-01-26 LAB
25(OH)D3 SERPL-MCNC: 21.9 NG/ML (ref 20–100)
ALBUMIN SERPL BCG-MCNC: 3.7 G/DL (ref 3.2–5.2)
ALP SERPL-CCNC: 70 U/L (ref 46–116)
ALT SERPL W P-5'-P-CCNC: 18 U/L (ref 7–40)
AST SERPL-CCNC: 18 U/L (ref ?–34)
BILIRUB SERPL-MCNC: 0.9 MG/DL (ref 0.3–1.2)
BUN SERPL-MCNC: 15 MG/DL (ref 9–23)
CALCIUM SERPL-MCNC: 9.3 MG/DL (ref 8.3–10.6)
CHLORIDE SERPL-SCNC: 106 MMOL/L (ref 98–107)
CHOLEST SERPL-MCNC: 175 MG/DL (ref ?–200)
CHOLEST SERPL-MCNC: 178 MG/DL (ref ?–200)
CHOLEST/HDLC SERPL: 2.12 {RATIO} (ref ?–5)
CHOLEST/HDLC SERPL: 2.12 {RATIO} (ref ?–5)
CO2 SERPL-SCNC: 27 MMOL/L (ref 20–31)
CREAT SERPL-MCNC: 0.73 MG/DL (ref 0.55–1.3)
EST. AVERAGE GLUCOSE BLD GHB EST-MCNC: 123 MG/DL (ref 60–110)
FOLATE SERPL-MCNC: 23.4 NG/ML (ref 5.4–?)
GFR SERPL CREATININE-BSD FRML MDRD: > 60 ML/MIN/{1.73_M2} (ref 39–?)
GLUCOSE SERPL-MCNC: 97 MG/DL (ref 74–106)
HDLC SERPL-MCNC: 82.5 MG/DL (ref 40–?)
HDLC SERPL-MCNC: 83.9 MG/DL (ref 40–?)
LDLC SERPL CALC-MCNC: 72.5 MG/DL (ref ?–100)
LDLC SERPL CALC-MCNC: 73.5 MG/DL (ref ?–100)
NONHDLC SERPL-MCNC: 93 MG/DL
NONHDLC SERPL-MCNC: 94 MG/DL
POTASSIUM SERPL-SCNC: 4.1 MMOL/L (ref 3.5–5.1)
PROT SERPL-MCNC: 7.7 G/DL (ref 5.7–8.2)
SODIUM SERPL-SCNC: 140 MMOL/L (ref 136–145)
TRIGL SERPL-MCNC: 100 MG/DL (ref ?–150)
TRIGL SERPL-MCNC: 103 MG/DL (ref ?–150)
VIT B12 SERPL-MCNC: 914 PG/ML (ref 211–911)

## 2023-02-02 LAB
A-TOCOPHEROL VIT E SERPL-MCNC: 11.3 MG/L (ref 9–29)
GAMMA TOCOPHEROL SERPL-MCNC: 1.6 MG/L (ref 0.5–4.9)
PYRIDOXAL PHOS SERPL-MCNC: 9.5 UG/L (ref 3.4–65.2)
VIT B1 BLD-SCNC: 119.2 NMOL/L (ref 66.5–200)

## 2024-08-14 ENCOUNTER — HOSPITAL ENCOUNTER (OUTPATIENT)
Dept: HOSPITAL 53 - M LAB | Age: 74
End: 2024-08-14
Attending: INTERNAL MEDICINE
Payer: MEDICARE

## 2024-08-14 DIAGNOSIS — G47.33: ICD-10-CM

## 2024-08-14 DIAGNOSIS — I67.9: Primary | ICD-10-CM

## 2024-08-14 DIAGNOSIS — R73.01: ICD-10-CM

## 2024-08-14 DIAGNOSIS — R60.0: ICD-10-CM

## 2024-08-14 DIAGNOSIS — I10: ICD-10-CM

## 2024-08-14 DIAGNOSIS — I10: Primary | ICD-10-CM

## 2024-08-14 DIAGNOSIS — E78.5: ICD-10-CM

## 2024-08-14 LAB
ALBUMIN SERPL BCG-MCNC: 3.4 G/DL (ref 3.2–5.2)
ALBUMIN SERPL BCG-MCNC: 3.5 G/DL (ref 3.2–5.2)
ALP SERPL-CCNC: 71 U/L (ref 46–116)
ALT SERPL W P-5'-P-CCNC: 13 U/L (ref 7–40)
AST SERPL-CCNC: 10 U/L (ref ?–34)
BILIRUB SERPL-MCNC: 0.7 MG/DL (ref 0.3–1.2)
BUN SERPL-MCNC: 14 MG/DL (ref 9–23)
BUN SERPL-MCNC: 15 MG/DL (ref 9–23)
CALCIUM SERPL-MCNC: 8.8 MG/DL (ref 8.3–10.6)
CALCIUM SERPL-MCNC: 8.9 MG/DL (ref 8.3–10.6)
CHLORIDE SERPL-SCNC: 107 MMOL/L (ref 98–107)
CHLORIDE SERPL-SCNC: 108 MMOL/L (ref 98–107)
CHOLEST SERPL-MCNC: 207 MG/DL (ref ?–200)
CHOLEST/HDLC SERPL: 2.58 {RATIO} (ref ?–5)
CO2 SERPL-SCNC: 27 MMOL/L (ref 20–31)
CO2 SERPL-SCNC: 28 MMOL/L (ref 20–31)
CREAT SERPL-MCNC: 0.74 MG/DL (ref 0.55–1.3)
CREAT SERPL-MCNC: 0.75 MG/DL (ref 0.55–1.3)
EST. AVERAGE GLUCOSE BLD GHB EST-MCNC: 120 MG/DL (ref 60–110)
GFR SERPL CREATININE-BSD FRML MDRD: > 60 ML/MIN/{1.73_M2} (ref 39–?)
GFR SERPL CREATININE-BSD FRML MDRD: > 60 ML/MIN/{1.73_M2} (ref 39–?)
GLUCOSE SERPL-MCNC: 104 MG/DL (ref 74–106)
GLUCOSE SERPL-MCNC: 105 MG/DL (ref 74–106)
HCT VFR BLD AUTO: 41.5 % (ref 36–47)
HDLC SERPL-MCNC: 80.1 MG/DL (ref 40–?)
HGB BLD-MCNC: 13.7 G/DL (ref 12–15.5)
LDLC SERPL CALC-MCNC: 110.1 MG/DL (ref ?–100)
MCH RBC QN AUTO: 29 PG (ref 27–33)
MCHC RBC AUTO-ENTMCNC: 33 G/DL (ref 32–36.5)
MCV RBC AUTO: 87.9 FL (ref 80–96)
NONHDLC SERPL-MCNC: 126.9 MG/DL
PHOSPHATE SERPL-MCNC: 4.5 MG/DL (ref 2.4–5.1)
PLATELET # BLD AUTO: 242 10^3/UL (ref 150–450)
POTASSIUM SERPL-SCNC: 3.6 MMOL/L (ref 3.5–5.1)
POTASSIUM SERPL-SCNC: 3.7 MMOL/L (ref 3.5–5.1)
PROT SERPL-MCNC: 7.4 G/DL (ref 5.7–8.2)
RBC # BLD AUTO: 4.72 10^6/UL (ref 4–5.4)
SODIUM SERPL-SCNC: 139 MMOL/L (ref 136–145)
SODIUM SERPL-SCNC: 142 MMOL/L (ref 136–145)
TRIGL SERPL-MCNC: 84 MG/DL (ref ?–150)
TSH SERPL DL<=0.005 MIU/L-ACNC: 2.49 UIU/ML (ref 0.55–4.78)
WBC # BLD AUTO: 8.4 10^3/UL (ref 4–10)

## 2024-08-21 ENCOUNTER — HOSPITAL ENCOUNTER (OUTPATIENT)
Dept: HOSPITAL 53 - M WHC | Age: 74
End: 2024-08-21
Attending: INTERNAL MEDICINE
Payer: MEDICARE

## 2024-08-21 DIAGNOSIS — R92.313: ICD-10-CM

## 2024-08-21 DIAGNOSIS — Z12.31: Primary | ICD-10-CM

## 2025-05-02 ENCOUNTER — HOSPITAL ENCOUNTER (OUTPATIENT)
Dept: HOSPITAL 53 - M LAB | Age: 75
End: 2025-05-02
Attending: PHYSICIAN ASSISTANT
Payer: MEDICARE

## 2025-05-02 ENCOUNTER — HOSPITAL ENCOUNTER (OUTPATIENT)
Dept: HOSPITAL 53 - M CARPUL | Age: 75
End: 2025-05-02
Attending: PHYSICIAN ASSISTANT
Payer: MEDICARE

## 2025-05-02 DIAGNOSIS — R73.01: ICD-10-CM

## 2025-05-02 DIAGNOSIS — E78.5: Primary | ICD-10-CM

## 2025-05-02 DIAGNOSIS — E78.5: ICD-10-CM

## 2025-05-02 DIAGNOSIS — R00.2: ICD-10-CM

## 2025-05-02 DIAGNOSIS — I11.9: ICD-10-CM

## 2025-05-02 DIAGNOSIS — I35.1: Primary | ICD-10-CM

## 2025-05-02 LAB
ALBUMIN SERPL BCG-MCNC: 3.5 G/DL (ref 3.2–5.2)
ALP SERPL-CCNC: 76 U/L (ref 35–104)
ALT SERPL W P-5'-P-CCNC: 16 U/L (ref 7–40)
AST SERPL-CCNC: 14 U/L (ref ?–34)
BUN SERPL-MCNC: 12 MG/DL (ref 9–23)
CALCIUM SERPL-MCNC: 9.2 MG/DL (ref 8.3–10.6)
CHLORIDE SERPL-SCNC: 106 MMOL/L (ref 98–107)
CHOLEST SERPL-MCNC: 240 MG/DL (ref ?–200)
CHOLEST/HDLC SERPL: 2.88 {RATIO} (ref ?–5)
CO2 SERPL-SCNC: 28 MMOL/L (ref 20–31)
CREAT SERPL-MCNC: 0.64 MG/DL (ref 0.55–1.3)
GFR SERPL CREATININE-BSD FRML MDRD: > 90 ML/MIN/{1.73_M2} (ref 39–?)
GLUCOSE SERPL-MCNC: 95 MG/DL (ref 74–106)
HCT VFR BLD AUTO: 43.2 % (ref 36–47)
HDLC SERPL-MCNC: 83.1 MG/DL (ref 40–?)
HGB BLD-MCNC: 14.2 G/DL (ref 12–15.5)
LDLC SERPL CALC-MCNC: 138.1 MG/DL (ref ?–100)
MCH RBC QN AUTO: 28.9 PG (ref 27–33)
MCHC RBC AUTO-ENTMCNC: 32.9 G/DL (ref 32–36.5)
MCV RBC AUTO: 87.8 FL (ref 80–96)
NONHDLC SERPL-MCNC: 156.9 MG/DL
PLATELET # BLD AUTO: 243 10^3/UL (ref 150–450)
PROT SERPL-MCNC: 7.6 G/DL (ref 5.7–8.2)
RBC # BLD AUTO: 4.92 10^6/UL (ref 4–5.4)
SODIUM SERPL-SCNC: 141 MMOL/L (ref 136–145)
T4 FREE SERPL-MCNC: 1.13 NG/DL (ref 0.89–1.76)
TRIGL SERPL-MCNC: 94 MG/DL (ref ?–150)
TSH SERPL DL<=0.005 MIU/L-ACNC: 2.64 UIU/ML (ref 0.55–4.78)
WBC # BLD AUTO: 8.8 10^3/UL (ref 4–10)